# Patient Record
Sex: FEMALE | Race: WHITE | Employment: UNEMPLOYED | URBAN - METROPOLITAN AREA
[De-identification: names, ages, dates, MRNs, and addresses within clinical notes are randomized per-mention and may not be internally consistent; named-entity substitution may affect disease eponyms.]

---
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
## 2021-07-31 ENCOUNTER — HOSPITAL ENCOUNTER (EMERGENCY)
Age: 21
Discharge: HOME | End: 2021-07-31
Attending: EMERGENCY MEDICINE
Payer: COMMERCIAL

## 2021-07-31 VITALS
HEART RATE: 83 BPM | TEMPERATURE: 98.8 F | DIASTOLIC BLOOD PRESSURE: 71 MMHG | RESPIRATION RATE: 16 BRPM | HEIGHT: 67 IN | WEIGHT: 162 LBS | BODY MASS INDEX: 25.43 KG/M2 | SYSTOLIC BLOOD PRESSURE: 135 MMHG | OXYGEN SATURATION: 98 %

## 2021-07-31 DIAGNOSIS — T23.222A PARTIAL THICKNESS BURN OF FINGER OF LEFT HAND, INITIAL ENCOUNTER: Primary | ICD-10-CM

## 2021-07-31 PROCEDURE — 99283 EMERGENCY DEPT VISIT LOW MDM: CPT

## 2021-07-31 PROCEDURE — 2500000003 HC RX 250 WO HCPCS: Performed by: EMERGENCY MEDICINE

## 2021-07-31 PROCEDURE — 16020 DRESS/DEBRID P-THICK BURN S: CPT

## 2021-07-31 RX ORDER — IBUPROFEN 800 MG/1
800 TABLET ORAL EVERY 8 HOURS PRN
Qty: 20 TABLET | Refills: 0 | Status: SHIPPED | OUTPATIENT
Start: 2021-07-31 | End: 2022-08-23

## 2021-07-31 RX ORDER — ACETAMINOPHEN AND CODEINE PHOSPHATE 300; 30 MG/1; MG/1
1 TABLET ORAL EVERY 6 HOURS PRN
Qty: 20 TABLET | Refills: 0 | Status: SHIPPED | OUTPATIENT
Start: 2021-07-31 | End: 2021-08-05

## 2021-07-31 RX ADMIN — SILVER SULFADIAZINE: 10 CREAM TOPICAL at 02:50

## 2021-07-31 ASSESSMENT — ENCOUNTER SYMPTOMS
DIARRHEA: 0
EYE DISCHARGE: 0
EYE REDNESS: 0
FACIAL SWELLING: 0
VOMITING: 0
SHORTNESS OF BREATH: 0
ABDOMINAL PAIN: 0
COLOR CHANGE: 0
COUGH: 0
CONSTIPATION: 0

## 2021-07-31 ASSESSMENT — PAIN SCALES - GENERAL: PAINLEVEL_OUTOF10: 5
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
## 2021-11-07 ENCOUNTER — HOSPITAL ENCOUNTER (EMERGENCY)
Age: 21
Discharge: HOME | End: 2021-11-07
Attending: EMERGENCY MEDICINE
Payer: COMMERCIAL

## 2021-11-07 VITALS
HEIGHT: 67 IN | OXYGEN SATURATION: 99 % | BODY MASS INDEX: 25.43 KG/M2 | RESPIRATION RATE: 16 BRPM | WEIGHT: 162 LBS | HEART RATE: 115 BPM | TEMPERATURE: 97.9 F | DIASTOLIC BLOOD PRESSURE: 86 MMHG | SYSTOLIC BLOOD PRESSURE: 153 MMHG

## 2021-11-07 DIAGNOSIS — S90.31XA CONTUSION OF RIGHT FOOT, INITIAL ENCOUNTER: Primary | ICD-10-CM

## 2021-11-07 PROCEDURE — 99284 EMERGENCY DEPT VISIT MOD MDM: CPT

## 2021-11-07 ASSESSMENT — ENCOUNTER SYMPTOMS
VOMITING: 0
DIARRHEA: 0
NAUSEA: 1
BACK PAIN: 0
ABDOMINAL PAIN: 0
SHORTNESS OF BREATH: 0

## 2021-11-07 NOTE — ED NOTES
Pt arrived to the ED with complaints that her rt foot went numb earlier and that she \"thinks she has a blood clot. \" Pt states that her foot is no longer numb and that she is able to feel me touching it. RR even and unlabored. No distress noted.       Felecia aMrtinez RN  11/07/21 5025

## 2021-11-07 NOTE — ED PROVIDER NOTES
Last Year: Not on file   Transportation Needs:     Lack of Transportation (Medical): Not on file    Lack of Transportation (Non-Medical): Not on file   Physical Activity:     Days of Exercise per Week: Not on file    Minutes of Exercise per Session: Not on file   Stress:     Feeling of Stress : Not on file   Social Connections:     Frequency of Communication with Friends and Family: Not on file    Frequency of Social Gatherings with Friends and Family: Not on file    Attends Islam Services: Not on file    Active Member of [address] [place] [address] [employer] or Organizations: Not on file    Attends Club or Organization Meetings: Not on file    Marital Status: Not on file   Intimate Partner Violence:     Fear of Current or Ex-Partner: Not on file    Emotionally Abused: Not on file    Physically Abused: Not on file    Sexually Abused: Not on file   Housing Stability:     Unable to Pay for Housing in the Last Year: Not on file    Number of [place] in the Last Year: Not on file    Unstable Housing in the Last Year: Not on file       Family History   Family history unknown: Yes        Allergies:  Mark-allergin [diphenhydramine]    Home Medications:  Prior to Admission medications    Medication Sig Start Date End Date Taking? Authorizing Provider   ibuprofen (ADVIL;MOTRIN) 800 MG tablet Take 1 tablet by mouth every 8 hours as needed for Pain 7/31/21   Woo Bradford MD   FLUoxetine (PROZAC) 10 MG capsule Take 1 capsule by mouth daily 7/23/20   Monica Kohler MD   traZODone (DESYREL) 50 MG tablet Take 1 tablet by mouth nightly as needed for Sleep 7/22/20   Monica Kohler MD   risperiDONE (RISPERDAL) 0.5 MG tablet Take 1 tablet by mouth 2 times daily 7/22/20   Monica Kohler MD       REVIEW OFSYSTEMS    (2-9 systems for level 4, 10 or more for level 5)      Review of Systems   Constitutional: Negative for chills and fever. HENT: Negative for tinnitus. Eyes: Negative for visual disturbance.    Respiratory: Negative for shortness of breath. Cardiovascular: Negative for chest pain and leg swelling. Gastrointestinal: Positive for nausea. Negative for abdominal pain, diarrhea and vomiting. Genitourinary: Negative for dysuria. Musculoskeletal: Negative for back pain. Neurological: Negative for dizziness, light-headedness and headaches. Hematological: Does not bruise/bleed easily. Psychiatric/Behavioral: The patient is nervous/anxious. PHYSICAL EXAM   (up to 7 for level 4, 8 or more forlevel 5)      INITIAL VITALS:   ED Triage Vitals [11/07/21 0011]   BP Temp Temp Source Pulse Resp SpO2 Height Weight   (!) 153/86 97.9 °F (36.6 °C) Infrared 115 16 99 % 5' 6.5\" (1.689 m) 162 lb (73.5 kg)       Physical Exam  Vitals reviewed. Constitutional:       General: She is not in acute distress. Appearance: Normal appearance. She is not ill-appearing. HENT:      Head: Normocephalic and atraumatic. Right Ear: External ear normal.      Left Ear: External ear normal.      Nose: Nose normal. No rhinorrhea. Mouth/Throat:      Mouth: Mucous membranes are dry. Eyes:      Extraocular Movements: Extraocular movements intact. Pupils: Pupils are equal, round, and reactive to light. Cardiovascular:      Rate and Rhythm: Regular rhythm. Tachycardia present. Pulses: Normal pulses. Heart sounds: Normal heart sounds. Pulmonary:      Effort: Pulmonary effort is normal.      Breath sounds: Normal breath sounds. Musculoskeletal:         General: No swelling or tenderness. Cervical back: Normal range of motion and neck supple. Skin:     General: Skin is warm and dry. Capillary Refill: Capillary refill takes less than 2 seconds. Comments: 3 mm diameter ecchymosis overlying the dorsum of the right foot. Neurological:      General: No focal deficit present. Mental Status: She is alert and oriented to person, place, and time.    Psychiatric:         Mood and Affect: Mood normal. Behavior: Behavior normal.         DIFFERENTIAL  DIAGNOSIS     PLAN (LABS / IMAGING / EKG):  No orders of the defined types were placed in this encounter. MEDICATIONS ORDERED:  No orders of the defined types were placed in this encounter. DDX: Superficial thrombophlebitis, superficial bruise    Initial MDM/Plan: 22 y.o. female who presents with small ecchymosis of right foot. Lesion is nontender and not warm. Clinical picture most consistent with ecchymosis and did not venous thrombosis or superficial thrombophlebitis. No work-up is required at this time. Reassured patient and discussed clinical presentation DVTs signs/symptoms to look out for. Patient acknowledged understanding. Will discharge patient home. DIAGNOSTIC RESULTS / EMERGENCYDEPARTMENT COURSE / MDM     LABS:  Labs Reviewed - No data to display      RADIOLOGY:  No results found. EMERGENCY DEPARTMENT COURSE:    Per initial MDM      PROCEDURES:  None    CONSULTS:  None    CRITICAL CARE:  Please see attending note    FINAL IMPRESSION      1.  Contusion of right foot, initial encounter          DISPOSITION / PLAN     DISPOSITION Decision To Discharge 11/07/2021 01:28:46 AM      PATIENT REFERRED TO:  [facility]  [address] [place] [address]  [phone]    If symptoms worsen      DISCHARGE MEDICATIONS:  New Prescriptions    No medications on file       Chandler Valdez MD  Emergency Medicine Resident    (Please note that portions of this note were completed with a voice recognition program.Efforts were made to edit the dictations but occasionally words are mis-transcribed.)      Chandler Valdez MD  Resident  11/07/21 0740

## 2021-11-10 NOTE — ED PROVIDER NOTES
[facility] 46   Emergency Department  Faculty Attestation       I performed a history and physical examination of the patient and discussed management with the resident. I reviewed the residents note and agree with the documented findings including all diagnostic interpretations and plan of care. Any areas of disagreement are noted on the chart. I was personally present for the key portions of any procedures. I have documented in the chart those procedures where I was not present during the key portions. I have reviewed the emergency nurses triage note. I agree with the chief complaint, past medical history, past surgical history, allergies, medications, social and family history as documented unless otherwise noted below. Documentation of the HPI, Physical Exam and Medical Decision Making performed by [email or web address] is based on my personal performance of the HPI, PE and MDM. For Physician Assistant/ Nurse Practitioner cases/documentation I have personally evaluated this patient and have completed at least one if not all key elements of the E/M (history, physical exam, and MDM). Additional findings are as noted. Pertinent Comments     Primary Care Physician: No primary care provider on file. ED Triage Vitals [11/07/21 0011]   BP Temp Temp Source Pulse Resp SpO2 Height Weight   (!) 153/86 97.9 °F (36.6 °C) Infrared 115 16 99 % 5' 6.5\" (1.689 m) 162 lb (73.5 kg)        History/Physical: This is a 22 y.o. female who presents to the Emergency Department with complaint of right foot spot. She noticed a dark spot on the dorsum of her right foot. No trauma. No pain of the calf or leg. No h/o DVT or PE. On exam the dorusm of the right foot has a small ecchymotic area with minimal tenderness. No warmth. No ucleration. Normal ROM of the foot and ankle. No tenderness of posterior calf. Pulses 2+.   Sensation and strength intact    MDM/Plan:   Contusion of the foot  Patient concerned for clot, but there is no indication of involvement of any deep venous system at this time, but did give her strict return precautions  [id number] [address] for d/c       Critical Care: None     Gianna Baltazar MD  Attending Emergency Physician        Gianna Baltazar MD  11/10/21 9868

## 2022-08-21 ENCOUNTER — HOSPITAL ENCOUNTER (EMERGENCY)
Age: 22
Discharge: HOME | End: 2022-08-22
Attending: EMERGENCY MEDICINE
Payer: COMMERCIAL

## 2022-08-21 VITALS
OXYGEN SATURATION: 99 % | SYSTOLIC BLOOD PRESSURE: 157 MMHG | HEART RATE: 79 BPM | RESPIRATION RATE: 18 BRPM | WEIGHT: 195 LBS | BODY MASS INDEX: 31.34 KG/M2 | TEMPERATURE: 97.9 F | HEIGHT: 66 IN | DIASTOLIC BLOOD PRESSURE: 74 MMHG

## 2022-08-21 DIAGNOSIS — L72.9 CYST OF SKIN: Primary | ICD-10-CM

## 2022-08-21 ASSESSMENT — PAIN - FUNCTIONAL ASSESSMENT: PAIN_FUNCTIONAL_ASSESSMENT: NONE - DENIES PAIN

## 2022-08-22 ENCOUNTER — ANCILLARY PROCEDURE (OUTPATIENT)
Dept: EMERGENCY DEPT | Age: 22
End: 2022-08-22
Payer: COMMERCIAL

## 2022-08-22 ENCOUNTER — HOSPITAL ENCOUNTER (EMERGENCY)
Age: 22
Discharge: HOME | End: 2022-08-23
Attending: EMERGENCY MEDICINE
Payer: COMMERCIAL

## 2022-08-22 DIAGNOSIS — L73.9 FOLLICULITIS: Primary | ICD-10-CM

## 2022-08-22 PROCEDURE — 99283 EMERGENCY DEPT VISIT LOW MDM: CPT

## 2022-08-22 PROCEDURE — 87660 TRICHOMONAS VAGIN DIR PROBE: CPT

## 2022-08-22 PROCEDURE — 87510 GARDNER VAG DNA DIR PROBE: CPT

## 2022-08-22 PROCEDURE — 76882 US LMTD JT/FCL EVL NVASC XTR: CPT

## 2022-08-22 PROCEDURE — 87480 CANDIDA DNA DIR PROBE: CPT

## 2022-08-22 PROCEDURE — 87491 CHLMYD TRACH DNA AMP PROBE: CPT

## 2022-08-22 PROCEDURE — 81025 URINE PREGNANCY TEST: CPT

## 2022-08-22 PROCEDURE — 81001 URINALYSIS AUTO W/SCOPE: CPT

## 2022-08-22 PROCEDURE — 87591 N.GONORRHOEAE DNA AMP PROB: CPT

## 2022-08-22 ASSESSMENT — PAIN SCALES - GENERAL: PAINLEVEL_OUTOF10: 0

## 2022-08-22 ASSESSMENT — PAIN - FUNCTIONAL ASSESSMENT: PAIN_FUNCTIONAL_ASSESSMENT: 0-10

## 2022-08-22 NOTE — ED PROVIDER NOTES
[facility]   Emergency Department  Faculty Attestation       I performed a history and physical examination of the patient and discussed management with the resident. I reviewed the residents note and agree with the documented findings including all diagnostic interpretations and plan of care. Any areas of disagreement are noted on the chart. I was personally present for the key portions of any procedures. I have documented in the chart those procedures where I was not present during the key portions. I have reviewed the emergency nurses triage note. I agree with the chief complaint, past medical history, past surgical history, allergies, medications, social and family history as documented unless otherwise noted below. Documentation of the HPI, Physical Exam and Medical Decision Making performed by scribdidi is based on my personal performance of the HPI, PE and MDM. For Physician Assistant/ Nurse Practitioner cases/documentation I have personally evaluated this patient and have completed at least one if not all key elements of the E/M (history, physical exam, and MDM). Additional findings are as noted. Pertinent Comments     Primary Care Physician: No primary care provider on file. ED Triage Vitals [08/21/22 2348]   BP Temp Temp Source Heart Rate Resp SpO2 Height Weight   133/73 97.3 °F (36.3 °C) Oral 73 16 100 % 5' 6\" (1.676 m) 195 lb (88.5 kg)          This is a 25 y.o. female who presents to the Emergency Department bump to the inner left thigh. Noticed about 1 month ago. Has had 1 similar one in the past that then leaked some clear fluid and then resolved. Exam performed with resident in the room as chaperone. On exam, there is a small area  ~ 3 cm distal from inguinal fold on the left inner thigh has some hyperpigmentation does have a small mobile soft density that is less than 1 cm underneath the skin. No tenderness. No surrounding erythema. No fluctuance.   Does not appear to be an abscess. Bedside point of care ultrasound performed. Nonspecific findings, no obvious drainable abscess or cystic area. Read/report available in Epic under imaging section and images available in PACS. Unclear if this finding, however does not have any signs of infection at this time. No areas to drain. And therefore recommend outpatient follow-up with dermatology for skin lesion.   Okay for discharge      Critical Care: None    Jennifer Barajas MD  Attending Emergency Physician         Jennifer Barajas MD  08/22/22 1124

## 2022-08-22 NOTE — ED PROVIDER NOTES
/ FAMILY HISTORY      has no past medical history on file. Denies past medical history     has no past surgical history on file. Denies past surgical history    Social History     Socioeconomic History    Marital status: Single     Spouse name: Not on file    Number of children: Not on file    Years of education: Not on file    Highest education level: Not on file   Occupational History    Not on file   Tobacco Use    Smoking status: Every Day     Packs/day: 1.00     Types: Cigarettes    Smokeless tobacco: Never   Vaping Use    Vaping Use: Never used   Substance and Sexual Activity    Alcohol use: Yes     Comment: occ    Drug use: Yes     Types: Marijuana Longoria Kugel)    Sexual activity: Yes     Partners: Female   Other Topics Concern    Not on file   Social History Narrative    Not on file     Social Determinants of Health     Financial Resource Strain: Low Risk     Difficulty of Paying Living Expenses: Not hard at all   Food Insecurity: No Food Insecurity    Worried About Running Out of Food in the Last Year: Never true    Ran Out of Food in the Last Year: Never true   Transportation Needs: Not on file   Physical Activity: Not on file   Stress: Not on file   Social Connections: Not on file   Intimate Partner Violence: Not on file   Housing Stability: Not on file       Family History   Family history unknown: Yes       Allergies:  Mark-allergin [diphenhydramine]    Home Medications:  Prior to Admission medications    Medication Sig Start Date End Date Taking?  Authorizing Provider   cephALEXin (KEFLEX) 500 MG capsule Take 1 capsule by mouth 4 times daily for 7 days 8/23/22 8/30/22  Queen Hector MD   metroNIDAZOLE (FLAGYL) 500 MG tablet Take 1 tablet by mouth 2 times daily for 7 days 8/30/22 9/6/22  Howard Denney MD   cloNIDine (CATAPRES) 0.1 MG tablet  6/21/22   Historical Provider, MD   FLUoxetine (PROZAC) 20 MG capsule  7/26/22   Historical Provider, MD       REVIEW OF SYSTEMS    (2-9 systems for level 4, 10 or more for level 5)      Review of Systems   Constitutional:  Negative for chills, fatigue and fever. HENT:  Negative for congestion, rhinorrhea and sore throat. Eyes:  Negative for visual disturbance. Respiratory:  Negative for cough and shortness of breath. Cardiovascular:  Negative for chest pain. Gastrointestinal:  Negative for abdominal pain, diarrhea, nausea and vomiting. Genitourinary:  Negative for dysuria and hematuria. Musculoskeletal:  Negative for arthralgias and myalgias. Skin:  Negative for rash. Nodule on left inner thigh   Neurological:  Negative for dizziness, tremors, syncope, weakness, light-headedness, numbness and headaches. All other systems reviewed and are negative. PHYSICAL EXAM   (up to 7 for level 4, 8 or more for level 5)      INITIAL VITALS:   BP (!) 157/74   Pulse 79   Temp 97.9 °F (36.6 °C)   Resp 18   Ht 5' 6\" (1.676 m)   Wt 195 lb (88.5 kg)   SpO2 99%   BMI 31.47 kg/m²     Physical Exam  Vitals reviewed. Constitutional:       General: She is not in acute distress. Appearance: She is not toxic-appearing. HENT:      Head: Normocephalic and atraumatic. Mouth/Throat:      Mouth: Mucous membranes are moist.      Pharynx: Oropharynx is clear. Eyes:      Extraocular Movements: Extraocular movements intact. Pupils: Pupils are equal, round, and reactive to light. Cardiovascular:      Rate and Rhythm: Normal rate and regular rhythm. Pulses: Normal pulses. Heart sounds: Normal heart sounds. Pulmonary:      Effort: Pulmonary effort is normal.      Breath sounds: Normal breath sounds. No decreased breath sounds, wheezing, rhonchi or rales. Abdominal:      Palpations: Abdomen is soft. Tenderness: There is no abdominal tenderness. There is no guarding or rebound. Genitourinary:     Comments: Small soft mobile density on left inner thigh near inguinal crease. Nontender to palpation. Fully mobile on examination.   No fluctuance. No erythema. Musculoskeletal:         General: Normal range of motion. Cervical back: Normal range of motion and neck supple. Right lower leg: No edema. Left lower leg: No edema. Skin:     Capillary Refill: Capillary refill takes less than 2 seconds. Findings: No rash. Neurological:      General: No focal deficit present. Mental Status: She is alert and oriented to person, place, and time. Motor: No weakness. DIFFERENTIAL  DIAGNOSIS     PLAN (LABS / IMAGING / EKG):  Orders Placed This Encounter   Procedures    POC US EXTREMITY NON VASC LIMITED       MEDICATIONS ORDERED:  No orders of the defined types were placed in this encounter. DDX: Cyst versus abscess versus soft tissue infection versus cellulitis versus sexually-transmitted infection versus inflamed lymph node    DIAGNOSTIC RESULTS / EMERGENCY DEPARTMENT COURSE / MDM   LAB RESULTS:  No results found for this visit on 08/21/22. IMPRESSION: 78-year-old female presents with a bump to her left inner groin that has been present and unchanging for the past 2 months. On examination, patient resting comfortably in the bed in no acute distress. Vital signs stable. Patient afebrile. There is a small mobile soft density near the inguinal fold on the left inner thigh with mild hyperpigmentation. There is no fluctuance to the area. The area is mobile with any palpation. No surrounding erythema. Nontender to palpation in the area. Bedside ultrasound showing no obvious drainable abscess or cystic area. No other similar areas anywhere on patient's body. Discussed with patient that there is no area that is drainable at this time, and she does not have any current signs of infection. Recommended the patient follow-up with dermatology for skin lesion. Provided recommendation for PCP and dermatology follow-up. Discussed return precautions at length.   Patient voiced understanding and agreement with plan.    RADIOLOGY:  POC US EXTREMITY NON VASC LIMITED    Result Date: 8/22/2022  POCUS_Soft_Tissue:     Exam Information:          Exam type:  Clinically indicated     Indication(s) for Exam:          The exam was performed with the following indications[de-identified]  Soft tissue pain, Soft tissue swelling     Views obtained/location & Images Saved for These Views:          Multiple sonographic views were obtained in the following specific location:  Left thigh just distal to inguinal fold     Findings:          Exam of the above structures revealed the following findings[de-identified]  Normal exam     Interpretation:          Indeterminate         Other[de-identified]  No obvious fluid collection - No cobblestoning. There is a small indeterminate circular hyperechoic region that is nonspecific. Confirmatory study:          What confirmatory study was done?:  Not applicable  Electronically signed by Cece Chavez on Monday, August 22, 2022 at 12:37 AM : Ankit Davidson Attending: Cece Chavez      CONSULTS:  None    FINAL IMPRESSION      1.  Cyst of skin          DISPOSITION / PLAN     DISPOSITION Decision To Discharge 08/22/2022 12:48:46 AM      PATIENT REFERRED TO:  [address] [place], [place] [id number] [phone]  Schedule an appointment as soon as possible for a visit in 2 days      [facility]  [address] [facility] [address]  [phone]  Go to   If symptoms worsen    [address] [place] [address]  [phone]  Go to   As needed    DISCHARGE MEDICATIONS:  Discharge Medication List as of 8/22/2022 12:52 AM          Maggie Guzman MD  Emergency Medicine Resident    (Please note that portions of thisnote were completed with a voice recognition program.  Efforts were made to edit the dictations but occasionally words are mis-transcribed.)       Maggie Guzman MD  Resident  08/27/22 6051

## 2022-08-22 NOTE — DISCHARGE INSTRUCTIONS
Please schedule a follow up appointment with a primary care physician and a dermatologist. Lucie Avila do not need antibiotics at this time - however, if you start to have fevers or pus-like drainage from the area, you may need to start antibiotics and should be seen by a physician. For pain use ibuprofen (Motrin / Advil) or acetaminophen (Tylenol), unless prescribed medications that have acetaminophen in it. You can take over the counter acetaminophen tablets (1 - 2 tablets of the 500-mg strength every 6 hours) or ibuprofen tablets (2 tablets every 4 hours). PLEASE RETURN TO THE EMERGENCY DEPARTMENT IMMEDIATELY for worsening symptoms, white drainage from the wound, redness or streaking, or if you develop any concerning symptoms such as: high fever not relieved by acetaminophen (Tylenol) and/or ibuprofen (Motrin / Advil), chills, shortness of breath, chest pain, feeling of your heart fluttering or racing, persistent nausea and/or vomiting, numbness, weakness or tingling in the arms or legs or change in color of the extremities, changes in mental status, persistent headache, blurry vision, unable to follow up with your physician, or other any other care or concern.

## 2022-08-22 NOTE — ED TRIAGE NOTES
Pt arrived to Ed with c/o boil on left side og groin./labia region. Pt states she noticed bump about 1 month ago. Pt denies any fever and/or chills. Pt A&O x 4, does not appear in acute distress, RR even and unlabored, resting comfortably on stretcher with eyes open and call light in reach. Vital signs obtained, medical hx and allergies reviewed with pt. Initial assessment performed by physician, Celsa Maradiaga will carry out initial orders/tasks and reassess pt.

## 2022-08-23 VITALS
OXYGEN SATURATION: 99 % | HEART RATE: 100 BPM | SYSTOLIC BLOOD PRESSURE: 122 MMHG | DIASTOLIC BLOOD PRESSURE: 77 MMHG | RESPIRATION RATE: 18 BRPM | TEMPERATURE: 98.7 F

## 2022-08-23 PROBLEM — B96.89 BACTERIAL VAGINOSIS: Status: ACTIVE | Noted: 2022-08-23

## 2022-08-23 PROBLEM — N76.0 BACTERIAL VAGINOSIS: Status: ACTIVE | Noted: 2022-08-23

## 2022-08-23 PROBLEM — N89.8 VAGINAL CYST: Status: ACTIVE | Noted: 2022-08-23

## 2022-08-23 LAB
BILIRUBIN URINE: NEGATIVE
C TRACH DNA GENITAL QL NAA+PROBE: NEGATIVE
CANDIDA SPECIES, DNA PROBE: NEGATIVE
CASTS UA: NORMAL /LPF (ref 0–8)
COLOR: YELLOW
CULTURE: NO GROWTH
EPITHELIAL CELLS UA: NORMAL /HPF (ref 0–5)
GARDNERELLA VAGINALIS, DNA PROBE: POSITIVE
GLUCOSE URINE: NEGATIVE
HCG(URINE) PREGNANCY TEST: NEGATIVE
KETONES, URINE: ABNORMAL
LEUKOCYTE ESTERASE, URINE: ABNORMAL
N. GONORRHOEAE DNA: NEGATIVE
NITRITE, URINE: NEGATIVE
PH UA: 6.5 (ref 5–8)
PROTEIN UA: NEGATIVE
RBC UA: NORMAL /HPF (ref 0–4)
SOURCE: ABNORMAL
SPECIFIC GRAVITY UA: 1.01 (ref 1–1.03)
SPECIMEN DESCRIPTION: NORMAL
SPECIMEN DESCRIPTION: NORMAL
TRICHOMONAS VAGINALIS DNA: NEGATIVE
TURBIDITY: CLEAR
URINE HGB: NEGATIVE
UROBILINOGEN, URINE: NORMAL
WBC UA: NORMAL /HPF (ref 0–5)

## 2022-08-23 PROCEDURE — 87086 URINE CULTURE/COLONY COUNT: CPT

## 2022-08-23 RX ORDER — CEPHALEXIN 500 MG/1
500 CAPSULE ORAL 4 TIMES DAILY
Qty: 28 CAPSULE | Refills: 0 | Status: SHIPPED | OUTPATIENT
Start: 2022-08-23 | End: 2022-08-29

## 2022-08-23 ASSESSMENT — ENCOUNTER SYMPTOMS
BLOOD IN STOOL: 0
DIARRHEA: 0
NAUSEA: 0
ABDOMINAL PAIN: 0
VOMITING: 0
CONSTIPATION: 0

## 2022-08-23 NOTE — ED TRIAGE NOTES
Pt arrived to Ed with c/o lump on labia which showed up today. Pt was seen yesterday for cyst on left inner groin. Writer is extended triage nurse. Assessment and treatment for this patient was primarily performed by resident and attending with extended triage nurse performing tasks as directed. Pt seen primarily by resident and attending physicians. RN assessment deferred to physician assessment.

## 2022-08-23 NOTE — ED NOTES
The following labs labeled with pt sticker and tubed to lab:     [] Blue     [] Lavender   [] on ice  [] Green/yellow  [] Green/black [] on ice  [] Pia Greener  [] on ice  [] Yellow  [] Red  [] Pink  [] VBG  [] VBG    [] COVID-19 swab    [] Rapid  [] PCR  [] Flu swab  [] Peds Viral Panel     [x] Urine Sample  [x] Pelvic Cultures  [] Blood Cultures   [] STREP Cultures       Trina Calzada LPN  61/27/75 1332

## 2022-08-23 NOTE — ED NOTES
Urine and pelvic specimens obtained, labelled appropriately, and sent to hoda.       Wendy Howard LPN  05/73/68 9833

## 2022-08-24 NOTE — ED PROVIDER NOTES
[facility] ED  Emergency Department Encounter  Emergency Medicine Resident     Pt Cade Marie  MRN: [id number]  [place] 2000  Date of evaluation: 8/23/22  PCP:  Howard Denney MD      CHIEF COMPLAINT       Chief Complaint   Patient presents with    Cyst     On labia        HISTORY OF PRESENT ILLNESS  (Location/Symptom, Timing/Onset, Context/Setting, Quality, Duration, Modifying Factors, Severity.)      Elva Garica is a 25 y.o. female who presents with a cyst in the groin. Patient states that she was discharged yesterday for a similar encounter, being diagnosed with a cyst on her left upper thigh. Patient was informed to return to the emergency department should she notice worsening of her symptoms including new cyst.  The patient states that she just noticed this cyst today, but is unsure of how long it has been there. The patient denies any vaginal discharge, vaginal bleeding, and has regular menstrual periods [LMP 3 weeks ago]. Patient denies any fever, chills, rash, other skin lesions, history of previous skin lesions, or use of new soaps/skin products. Patient is sexually active with 1 male partner. PAST MEDICAL / SURGICAL / SOCIAL / FAMILY HISTORY      has no past medical history on file. has no past surgical history on file.       Social History     Socioeconomic History    Marital status: Single     Spouse name: Not on file    Number of children: Not on file    Years of education: Not on file    Highest education level: Not on file   Occupational History    Not on file   Tobacco Use    Smoking status: Every Day     Packs/day: 1.00     Types: Cigarettes    Smokeless tobacco: Never   Vaping Use    Vaping Use: Never used   Substance and Sexual Activity    Alcohol use: Yes     Comment: occ    Drug use: Yes     Types: Marijuana Longoriahoney Garcia)    Sexual activity: Yes     Partners: Female   Other Topics Concern    Not on file   Social History Narrative    Not on file toxic-appearing. Abdominal:      General: Abdomen is flat. There is no distension. Palpations: Abdomen is soft. Tenderness: There is no abdominal tenderness. There is no guarding or rebound. Hernia: There is no hernia in the left inguinal area or right inguinal area. Genitourinary:     General: Normal vulva. Exam position: Lithotomy position. Pubic Area: No rash. Usman stage (genital): 5. Labia:         Right: No tenderness, lesion or injury. Left: No tenderness, lesion or injury. Vagina: No signs of injury and foreign body. No erythema, tenderness, bleeding or lesions. Cervix: Cervical motion tenderness present. No discharge, friability, erythema or cervical bleeding. Adnexa: Right adnexa normal and left adnexa normal.        Right: No mass, tenderness or fullness. Left: No mass, tenderness or fullness. Comments: 1 lesion measuring 0.5 cm in diameter located lateral to the left labial folds. Lymphadenopathy:      Lower Body: No right inguinal adenopathy. No left inguinal adenopathy. Skin:     General: Skin is warm and dry. Capillary Refill: Capillary refill takes less than 2 seconds. Neurological:      Mental Status: She is alert and oriented to person, place, and time.        DIFFERENTIAL  DIAGNOSIS     PLAN (LABS / IMAGING / EKG):  Orders Placed This Encounter   Procedures    C.trachomatis N.gonorrhoeae DNA (Cervical or Vaginal Swab)    Vaginitis DNA Probe    Culture, Urine    Urinalysis with Reflex to Culture    Pregnancy, Urine    Microscopic Urinalysis    Pelvic exam setup    Vital signs       MEDICATIONS ORDERED:  Orders Placed This Encounter   Medications    cephALEXin (KEFLEX) 500 MG capsule     Sig: Take 1 capsule by mouth 4 times daily for 7 days     Dispense:  28 capsule     Refill:  0       DDX: Folliculitis, Bartholin abscess, Bartholin cyst, skene cyst/abscess, syphilis, skin abscess, and herpetic infection were all considered in the differential diagnosis. DIAGNOSTIC RESULTS / EMERGENCY DEPARTMENT COURSE / [facility]   LAB RESULTS:  Results for orders placed or performed during the hospital encounter of 08/22/22   C.trachomatis N.gonorrhoeae DNA (Cervical or Vaginal Swab)    Specimen: Cervix   Result Value Ref Range    Specimen Description . CERVIX     C. trachomatis DNA NEGATIVE NEGATIVE    N. gonorrhoeae DNA NEGATIVE NEGATIVE   Vaginitis DNA Probe    Specimen: Vaginal   Result Value Ref Range    Source . VAGINAL SWAB     Trichomonas Vaginalis DNA NEGATIVE NEGATIVE    GARDNERELLA VAGINALIS, DNA PROBE POSITIVE (A) NEGATIVE    CANDIDA SPECIES, DNA PROBE NEGATIVE NEGATIVE   Urinalysis with Reflex to Culture    Specimen: Urine   Result Value Ref Range    Color, UA Yellow Yellow    Turbidity UA Clear Clear    Glucose, Ur NEGATIVE NEGATIVE    Bilirubin Urine NEGATIVE NEGATIVE    Ketones, Urine TRACE (A) NEGATIVE    Specific Gravity, UA 1.015 1.005 - 1.030    Urine Hgb NEGATIVE NEGATIVE    pH, UA 6.5 5.0 - 8.0    Protein, UA NEGATIVE NEGATIVE    Urobilinogen, Urine Normal Normal    Nitrite, Urine NEGATIVE NEGATIVE    Leukocyte Esterase, Urine SMALL (A) NEGATIVE   Pregnancy, Urine   Result Value Ref Range    HCG(Urine) Pregnancy Test NEGATIVE NEGATIVE   Microscopic Urinalysis   Result Value Ref Range    WBC, UA 10 TO 20 0 - 5 /HPF    RBC, UA 2 TO 5 0 - 4 /HPF    Casts UA  0 - 8 /LPF     2 TO 5 HYALINE Reference range defined for non-centrifuged specimen.     Epithelial Cells UA 5 TO 10 0 - 5 /HPF       IMPRESSION: Skin abscess    RADIOLOGY:  No orders to display     EKG    All EKG's are interpreted by the Emergency Department Physician who either signs or Co-signs this chart in the absence of a cardiologist.    EMERGENCY DEPARTMENT COURSE:  Following history and examination, the patient was informed that a pelvic exam could be conducted as well but was not necessary for evaluation of the cyst.  The patient stated that she wanted the full pelvic examination. Routine pelvic labs were performed [gonorrhea and chlamydia screen, BV probe, urine pregnancy] as well as urinalysis. Results were positive for BV, the patient was informed that this is a bacterial overgrowth and as she is asymptomatic does not require treatment. Patient stated that she understood and was agreeable to being discharged on oral antibiotics for treatment of folliculitis. The patient stated that she had a follow-up with a new GP this week and would discuss with them options to see a gynecologist for future gynecological treatment. No notes of EC Admission Criteria type on file. PROCEDURES:  None    CONSULTS:  None    CRITICAL CARE:  None         FINAL IMPRESSION      1.  Folliculitis          DISPOSITION / PLAN     DISPOSITION Decision To Discharge 08/23/2022 02:05:23 AM      PATIENT REFERRED TO:  [facility]  [address] [place], [place] [phone]  Go to   If symptoms worsen      DISCHARGE MEDICATIONS:  Discharge Medication List as of 8/23/2022  2:06 AM        START taking these medications    Details   cephALEXin (KEFLEX) 500 MG capsule Take 1 capsule by mouth 4 times daily for 7 days, Disp-28 capsule, R-0Print             Yuliya Spangler MD  Emergency Medicine Resident    (Please note that portions of thisnote were completed with a voice recognition program.  Efforts were made to edit the dictations but occasionally words are mis-transcribed.)      Yuliya Spangler MD  Resident  08/23/22 [id number]       Yuliya Spangler MD  Resident  08/24/22 [id number]

## 2022-08-27 ASSESSMENT — ENCOUNTER SYMPTOMS
RHINORRHEA: 0
NAUSEA: 0
VOMITING: 0
COUGH: 0
SHORTNESS OF BREATH: 0
DIARRHEA: 0
ABDOMINAL PAIN: 0
SORE THROAT: 0

## 2022-08-27 NOTE — ED PROVIDER NOTES
[facility]   Emergency Department  Faculty Attestation       I performed a history and physical examination of the patient and discussed management with the resident. I reviewed the residents note and agree with the documented findings including all diagnostic interpretations and plan of care. Any areas of disagreement are noted on the chart. I was personally present for the key portions of any procedures. I have documented in the chart those procedures where I was not present during the key portions. I have reviewed the emergency nurses triage note. I agree with the chief complaint, past medical history, past surgical history, allergies, medications, social and family history as documented unless otherwise noted below. Documentation of the HPI, Physical Exam and Medical Decision Making performed by scribdidi is based on my personal performance of the HPI, PE and MDM. For Physician Assistant/ Nurse Practitioner cases/documentation I have personally evaluated this patient and have completed at least one if not all key elements of the E/M (history, physical exam, and MDM). Additional findings are as noted. Pertinent Comments     Primary Care Physician: Annmarie Restrepo MD      ED Triage Vitals   BP Temp Temp Source Heart Rate Resp SpO2 Height Weight   08/22/22 2239 08/22/22 2239 08/22/22 2239 08/22/22 2239 08/23/22 0202 08/22/22 2239 -- --   129/82 98.9 °F (37.2 °C) Oral (!) 104 18 97 %            This is a 25 y.o. female who presents to the Emergency Department new swelling in the groin area. Seen yesterday and had a nonspecific area oninner left thigh. Today has a swelling on the pubic region per patient. Pelvic exam and external exam performed by resident with writer as chaperone. There is a small area of folliculitis on the mons pubis area that has a small area of fluctuance.   Given that this is external we did discuss with patient on whether she would like to proceed w/. Pelvic and she did opt to continue. Given h/o multiple areas of swellings will start on antibiotics. UA   Vaginitis  G/C testing   Plan for d/c.        Critical Care: None     Brenda Zamudio MD  Attending Emergency Physician        Brenda Zamudio MD  08/27/22 1135

## 2022-08-29 ENCOUNTER — OFFICE VISIT (OUTPATIENT)
Dept: OBGYN CLINIC | Age: 22
End: 2022-08-29
Payer: COMMERCIAL

## 2022-08-29 ENCOUNTER — HOSPITAL ENCOUNTER (OUTPATIENT)
Age: 22
Setting detail: SPECIMEN
Discharge: HOME | End: 2022-08-29

## 2022-08-29 VITALS
HEART RATE: 61 BPM | SYSTOLIC BLOOD PRESSURE: 122 MMHG | DIASTOLIC BLOOD PRESSURE: 80 MMHG | WEIGHT: 181 LBS | BODY MASS INDEX: 29.09 KG/M2 | HEIGHT: 66 IN

## 2022-08-29 DIAGNOSIS — N76.0 ACUTE VAGINITIS: ICD-10-CM

## 2022-08-29 DIAGNOSIS — Z01.419 PAP SMEAR, AS PART OF ROUTINE GYNECOLOGICAL EXAMINATION: Primary | ICD-10-CM

## 2022-08-29 PROCEDURE — 99385 PREV VISIT NEW AGE 18-39: CPT | Performed by: CLINICAL NURSE SPECIALIST

## 2022-08-29 NOTE — PROGRESS NOTES
[facility]  [address] [address]  Dept: [phone]        DATE OF VISIT:  22        History and Physical    Porfirio Holley    :  2000  CHIEF COMPLAINT:    Chief Complaint   Patient presents with    Annual Exam                    Porfirio Holley is a 25 y.o. female new patient is a 24 yo female who presents for annual well woman exam.     The patient was seen and examined. Per the patient bowels areregular. She has no voiding complaints. She denies any bloating as well as vaginal discharge. Chaperone for Intimate Exam  Chaperone was offered as part of the rooming process. Patient declined and agrees to continue with exam without a chaperone. Chaperone: none     _____________________________________________________________________  Past Medical History:   Diagnosis Date    Depression     Bipolar                                                                   History reviewed. No pertinent surgical history.   Family History   Problem Relation Age of Onset    Stroke Paternal Grandfather     Hypertension Father     Diabetes Father     Hypertension Mother     Breast Cancer Mother      Social History     Tobacco Use   Smoking Status Every Day    Packs/day: 1.00    Types: Cigarettes   Smokeless Tobacco Never     Social History     Substance and Sexual Activity   Alcohol Use Yes    Comment: occ     Current Outpatient Medications   Medication Sig Dispense Refill    sulfamethoxazole-trimethoprim (BACTRIM DS;SEPTRA DS) 800-160 MG per tablet Take 1 tablet by mouth 2 times daily      cloNIDine (CATAPRES) 0.1 MG tablet       FLUoxetine (PROZAC) 20 MG capsule       fluconazole (DIFLUCAN) 150 MG tablet Take 1 tablet by mouth every 72 hours for 6 days (Patient not taking: Reported on 2022) 2 tablet 0    [START ON 2022] metroNIDAZOLE (FLAGYL) 500 MG tablet Take 1 tablet by mouth 2 times daily for 7 days (Patient not taking: Reported on 2022) 14 tablet 0     No current facility-administered medications for this visit. Allergies: Allergies   Allergen Reactions    Mark-Allergin [Diphenhydramine] Rash       Gynecologic History:  Patient's last menstrual period was 2022. Sexually Active: Yes  STD History:No  Birth Control: No    OB History    Para Term  AB Living   1 0 0 0 1 0   SAB IAB Ectopic Molar Multiple Live Births   1 0 0 0 0 0     ______________________________________________________________________    Review of Systems    REVIEW OFSYSTEMS:        Constitutional:  Unexpected weight change, extreme fatigue, night sweats              no  Skin:                           Rashes, moles   no  Neurological:  Frequentheadaches, seizures states that her psych doc thinks it is stress related has not see neurology         yes  Ophthalmic:  Recent visual changes no  ENT:   Difficulty swallowing  no  Breast:              Masses, pain, nipple discharge                            no     Respiratory:  Shortness of breath, coughing           no    Cardiovascular: Chest pain   no     Gastrointestinal: Chronic diarrhea/constipation, nausea/vomiting           no   Urogenital:  Urinary incontinence, frequency, urgency          no                                         Heavy/irregular periods           no                                      Vaginal discharge                   no  Hematological: Bruises easy   no     Endocrine:  Hot flashes   no     Hot/Cold Intolerance  no    Psychological:            Mood and affect were within normal limits. Depression and meds working well yes                 Physical Exam    Physical Exam:    Vitals:    22 1538   BP: 122/80   Site: Left Upper Arm   Position: Sitting   Cuff Size: Medium Adult   Pulse: 61   Weight: 181 lb (82.1 kg)   Height: 5' 6\" (1.676 m)       General Appearance: This  is a well developed, well nourished, well groomed female. Her BMI was reviewed. Nutritional decision making andexercise were discussed. Neurological:  The patient is alert and oriented to time,place, person, and situation    Skin:  A brief inspection of the skin revealed no rashes or lesions. Neck:  The neck was supple. Respiratory: There was unlabored respiratory effort. Lungs clear to ascultation. Cardiovascular: The patients extremities were without calf tenderness or edema. Heart with a regular rate and rhythm. Abdomen: The abdomen was soft and non-tender with no guarding, rebound or rigidity. No hernias were appreciated. Breast:   The patients breasts were symmetrical.  There were no masses, discharge or retractions noted. Self breast exams were reviewed. Pelvic Exam:  The external genitalia was with a normal appearance. The vaginal vault was normal. There were no cystocele, rectocele, or enterocele appreciated. There was thin and thicker white vaginal discharge with odor. The cervix was without lesions. There was no cervical motion tenderness. The uterus was mobile, midline and regular. The adnexa no fullness, tenderness or masses appreciated. ASSESSMENT: Normal annual well woman exam with vaginitis    25 y.o. Female; Annual   Diagnosis Orders   1. Pap smear, as part of routine gynecological examination  PAP SMEAR      2. Acute vaginitis  Vaginitis DNA Probe                        PLAN:  - Discussed new papsmear guidelines. - Birth control Discussed. - Smoking risk factors Discussed  - Diet and exercise reviewed. - Routine healthmaintenance per patients PCP.  - Return to clinic in 1 year or earlier with questions, problems, concerns. No follow-ups on file.         Electronically signed by BRIAN Dalal CNP on 8/29/2022 at 4:02 PM

## 2022-08-30 DIAGNOSIS — A59.9 TRICHIMONIASIS: Primary | ICD-10-CM

## 2022-08-30 DIAGNOSIS — B96.89 BV (BACTERIAL VAGINOSIS): ICD-10-CM

## 2022-08-30 DIAGNOSIS — N76.0 BV (BACTERIAL VAGINOSIS): ICD-10-CM

## 2022-08-30 LAB
CANDIDA SPECIES, DNA PROBE: NEGATIVE
GARDNERELLA VAGINALIS, DNA PROBE: POSITIVE
SOURCE: ABNORMAL
TRICHOMONAS VAGINALIS DNA: POSITIVE

## 2022-08-30 RX ORDER — METRONIDAZOLE 500 MG/1
500 TABLET ORAL 2 TIMES DAILY
Qty: 14 TABLET | Refills: 0 | Status: SHIPPED | OUTPATIENT
Start: 2022-08-30 | End: 2022-09-06

## 2022-09-09 LAB — CYTOLOGY REPORT: NORMAL

## 2023-10-09 ENCOUNTER — OFFICE VISIT (OUTPATIENT)
Dept: OBGYN CLINIC | Age: 23
End: 2023-10-09
Payer: COMMERCIAL

## 2023-10-09 ENCOUNTER — HOSPITAL ENCOUNTER (OUTPATIENT)
Age: 23
Discharge: HOME | End: 2023-10-09
Payer: COMMERCIAL

## 2023-10-09 VITALS
BODY MASS INDEX: 29.98 KG/M2 | HEIGHT: 67 IN | DIASTOLIC BLOOD PRESSURE: 78 MMHG | SYSTOLIC BLOOD PRESSURE: 126 MMHG | WEIGHT: 191 LBS

## 2023-10-09 DIAGNOSIS — N91.2 AMENORRHEA: Primary | ICD-10-CM

## 2023-10-09 DIAGNOSIS — N91.2 AMENORRHEA: ICD-10-CM

## 2023-10-09 LAB — B-HCG SERPL EIA 3RD IS-ACNC: 3190 MIU/ML

## 2023-10-09 PROCEDURE — 4004F PT TOBACCO SCREEN RCVD TLK: CPT | Performed by: NURSE PRACTITIONER

## 2023-10-09 PROCEDURE — 36415 COLL VENOUS BLD VENIPUNCTURE: CPT

## 2023-10-09 PROCEDURE — 99203 OFFICE O/P NEW LOW 30 MIN: CPT | Performed by: NURSE PRACTITIONER

## 2023-10-09 PROCEDURE — G8427 DOCREV CUR MEDS BY ELIG CLIN: HCPCS | Performed by: NURSE PRACTITIONER

## 2023-10-09 PROCEDURE — G8417 CALC BMI ABV UP PARAM F/U: HCPCS | Performed by: NURSE PRACTITIONER

## 2023-10-09 PROCEDURE — 84702 CHORIONIC GONADOTROPIN TEST: CPT

## 2023-10-09 PROCEDURE — G8484 FLU IMMUNIZE NO ADMIN: HCPCS | Performed by: NURSE PRACTITIONER

## 2023-10-09 RX ORDER — PNV NO.95/FERROUS FUM/FOLIC AC 28MG-0.8MG
1 TABLET ORAL DAILY
Qty: 30 TABLET | Refills: 11 | Status: SHIPPED | OUTPATIENT
Start: 2023-10-09

## 2023-10-09 RX ORDER — PYRIDOXINE HCL (VITAMIN B6) 50 MG
50 TABLET ORAL 2 TIMES DAILY
Qty: 60 TABLET | Refills: 3 | Status: SHIPPED | OUTPATIENT
Start: 2023-10-09 | End: 2023-11-08

## 2023-10-09 NOTE — PROGRESS NOTES
Pablo Galvin  10/9/2023    YOB: 2000          The patient was seen today. She is here regarding early pregnancy, missed menses. Arrives with significant other, Sophia Marcelo. LMP 8/3/2023. Had vaginal spotting in September for 5 days. Planned pregnancy. Hx of 2019. Hx of depression, anxiety and bipolar. Stopped prozac and seroquel 2 weeks ago. Currently seeing therapist every month. Her bowels are regular and she is voiding without difficulty. HPI:  Pablo Galvin is a 26 y.o. female      Missed menses      OB History    Para Term  AB Living   2 0 0 0 1 0   SAB IAB Ectopic Molar Multiple Live Births   1 0 0 0 0 0      # Outcome Date GA Lbr Guille/2nd Weight Sex Delivery Anes PTL Lv   2 2019           1                 Past Medical History:   Diagnosis Date    Depression     Bipolar       History reviewed. No pertinent surgical history.     Family History   Problem Relation Age of Onset    Stroke Paternal Grandfather     Hypertension Father     Diabetes Father     Diabetes Mother     Hypertension Mother     Breast Cancer Mother        Social History     Socioeconomic History    Marital status: Single     Spouse name: Not on file    Number of children: Not on file    Years of education: Not on file    Highest education level: Not on file   Occupational History    Not on file   Tobacco Use    Smoking status: Every Day     Packs/day: 1     Types: Cigarettes    Smokeless tobacco: Never   Vaping Use    Vaping Use: Never used   Substance and Sexual Activity    Alcohol use: Yes     Comment: occ    Drug use: Yes     Types: Marijuana Gita Lucas    Sexual activity: Yes     Partners: Female   Other Topics Concern    Not on file   Social History Narrative    Not on file     Social Determinants of Health     Financial Resource Strain: Low Risk  (2022)    Overall Financial Resource Strain (CARDIA)     Difficulty of Paying Living Expenses: Not hard at all   Food

## 2023-10-10 ENCOUNTER — TELEPHONE (OUTPATIENT)
Dept: OBGYN CLINIC | Age: 23
End: 2023-10-10

## 2023-10-10 DIAGNOSIS — Z34.90 EARLY STAGE OF PREGNANCY: Primary | ICD-10-CM

## 2023-10-10 NOTE — TELEPHONE ENCOUNTER
----- Message from BRIAN Macedo CNP sent at 10/10/2023  7:55 AM EDT -----  Please schedule new OB intake/ ultrasound. Prenatal vitamin 1 po QD with 12 refills.

## 2023-10-24 ENCOUNTER — INITIAL PRENATAL (OUTPATIENT)
Dept: OBGYN CLINIC | Age: 23
End: 2023-10-24

## 2023-10-24 ENCOUNTER — PROCEDURE VISIT (OUTPATIENT)
Dept: OBGYN CLINIC | Age: 23
End: 2023-10-24
Payer: COMMERCIAL

## 2023-10-24 VITALS
SYSTOLIC BLOOD PRESSURE: 118 MMHG | HEART RATE: 88 BPM | WEIGHT: 191 LBS | BODY MASS INDEX: 29.91 KG/M2 | DIASTOLIC BLOOD PRESSURE: 60 MMHG

## 2023-10-24 DIAGNOSIS — O09.91 SUPERVISION OF HIGH RISK PREGNANCY IN FIRST TRIMESTER: Primary | ICD-10-CM

## 2023-10-24 DIAGNOSIS — Z34.90 EARLY STAGE OF PREGNANCY: Primary | ICD-10-CM

## 2023-10-24 DIAGNOSIS — Z34.90 EARLY STAGE OF PREGNANCY: ICD-10-CM

## 2023-10-24 DIAGNOSIS — Z3A.01 7 WEEKS GESTATION OF PREGNANCY: ICD-10-CM

## 2023-10-24 LAB
CRL: NORMAL
SAC DIAMETER: NORMAL

## 2023-10-24 PROCEDURE — 99999 PR OFFICE/OUTPT VISIT,PROCEDURE ONLY: CPT | Performed by: OBSTETRICS & GYNECOLOGY

## 2023-10-24 PROCEDURE — 76801 OB US < 14 WKS SINGLE FETUS: CPT | Performed by: OBSTETRICS & GYNECOLOGY

## 2023-10-24 PROCEDURE — NBSRV NON-BILLABLE SERVICE: Performed by: NURSE PRACTITIONER

## 2023-10-24 NOTE — PROGRESS NOTES
Patient presents to office for OB intake, nurse visit only. Intake completed following ultrasound in office to confirm pregnancy dating/viability. Based on ultrasound, patient is currently 7w2d  gestation, Estimated Date of Delivery: 6/9/24. Patient presents to intake FOB. This was an planned pregnancy. Patient currently taking has a current medication list which includes the following prescription(s): prenatal vitamins and pyridoxine. . Patient's medical, surgical, obstetrical and family history reviewed. See HER for details. Patient prenatal lab work given to patient at this visit as well as OB education material. New OB consent forms signed. Patient accepted first trimester screening. Cystic Fibrosis screening ordered as well as an early 1hr due to Family hx DM. Referral placed to [facility] for first trimester screen. Patient scheduled for follow up OB appointment with Emiliana Gardner CNP 11/15/23. Patient instructed to complete lab work prior to follow up OB appointment.

## 2023-10-30 ENCOUNTER — HOSPITAL ENCOUNTER (OUTPATIENT)
Age: 23
Setting detail: SPECIMEN
Discharge: HOME | End: 2023-10-30
Payer: COMMERCIAL

## 2023-10-30 DIAGNOSIS — Z3A.01 7 WEEKS GESTATION OF PREGNANCY: ICD-10-CM

## 2023-10-30 DIAGNOSIS — Z34.90 EARLY STAGE OF PREGNANCY: ICD-10-CM

## 2023-10-30 LAB
ABO + RH BLD: NORMAL
BASOPHILS # BLD: 0.06 K/UL (ref 0–0.2)
BASOPHILS NFR BLD: 0 % (ref 0–2)
BILIRUB UR QL STRIP: NEGATIVE
BLOOD GROUP ANTIBODIES SERPL: NEGATIVE
CLARITY UR: CLEAR
COLOR UR: YELLOW
COMMENT: ABNORMAL
EOSINOPHIL # BLD: 0.11 K/UL (ref 0–0.44)
EOSINOPHILS RELATIVE PERCENT: 1 % (ref 1–4)
ERYTHROCYTE [DISTWIDTH] IN BLOOD BY AUTOMATED COUNT: 12.5 % (ref 11.8–14.4)
GLUCOSE UR STRIP-MCNC: NEGATIVE MG/DL
HBV SURFACE AG SERPL QL IA: NONREACTIVE
HCT VFR BLD AUTO: 40.2 % (ref 36.3–47.1)
HGB BLD-MCNC: 13.6 G/DL (ref 11.9–15.1)
HGB UR QL STRIP.AUTO: NEGATIVE
IMM GRANULOCYTES # BLD AUTO: 0.06 K/UL (ref 0–0.3)
IMM GRANULOCYTES NFR BLD: 0 %
KETONES UR STRIP-MCNC: ABNORMAL MG/DL
LEUKOCYTE ESTERASE UR QL STRIP: NEGATIVE
LYMPHOCYTES NFR BLD: 3.42 K/UL (ref 1.1–3.7)
LYMPHOCYTES RELATIVE PERCENT: 24 % (ref 24–43)
MCH RBC QN AUTO: 30.4 PG (ref 25.2–33.5)
MCHC RBC AUTO-ENTMCNC: 33.8 G/DL (ref 28.4–34.8)
MCV RBC AUTO: 89.9 FL (ref 82.6–102.9)
MONOCYTES NFR BLD: 0.64 K/UL (ref 0.1–1.2)
MONOCYTES NFR BLD: 4 % (ref 3–12)
NEUTROPHILS NFR BLD: 71 % (ref 36–65)
NEUTS SEG NFR BLD: 10.13 K/UL (ref 1.5–8.1)
NITRITE UR QL STRIP: NEGATIVE
NRBC BLD-RTO: 0 PER 100 WBC
PH UR STRIP: 7 [PH] (ref 5–8)
PLATELET # BLD AUTO: 267 K/UL (ref 138–453)
PMV BLD AUTO: 9.3 FL (ref 8.1–13.5)
PROT UR STRIP-MCNC: NEGATIVE MG/DL
RBC # BLD AUTO: 4.47 M/UL (ref 3.95–5.11)
RUBV IGG SERPL QL IA: 163.1 IU/ML
SP GR UR STRIP: 1.03 (ref 1–1.03)
TSH SERPL DL<=0.05 MIU/L-ACNC: 0.78 UIU/ML (ref 0.3–5)
UROBILINOGEN UR STRIP-ACNC: NORMAL EU/DL (ref 0–1)
WBC OTHER # BLD: 14.4 K/UL (ref 3.5–11.3)

## 2023-10-30 PROCEDURE — 86778 TOXOPLASMA ANTIBODY IGM: CPT

## 2023-10-30 PROCEDURE — 86777 TOXOPLASMA ANTIBODY: CPT

## 2023-10-30 PROCEDURE — 87340 HEPATITIS B SURFACE AG IA: CPT

## 2023-10-30 PROCEDURE — 81003 URINALYSIS AUTO W/O SCOPE: CPT

## 2023-10-30 PROCEDURE — 84443 ASSAY THYROID STIM HORMONE: CPT

## 2023-10-30 PROCEDURE — 86780 TREPONEMA PALLIDUM: CPT

## 2023-10-30 PROCEDURE — 81220 CFTR GENE COM VARIANTS: CPT

## 2023-10-30 PROCEDURE — 86850 RBC ANTIBODY SCREEN: CPT

## 2023-10-30 PROCEDURE — 82950 GLUCOSE TEST: CPT

## 2023-10-30 PROCEDURE — 86901 BLOOD TYPING SEROLOGIC RH(D): CPT

## 2023-10-30 PROCEDURE — 85025 COMPLETE CBC W/AUTO DIFF WBC: CPT

## 2023-10-30 PROCEDURE — 36415 COLL VENOUS BLD VENIPUNCTURE: CPT

## 2023-10-30 PROCEDURE — 87389 HIV-1 AG W/HIV-1&-2 AB AG IA: CPT

## 2023-10-30 PROCEDURE — 86900 BLOOD TYPING SEROLOGIC ABO: CPT

## 2023-10-30 PROCEDURE — 86762 RUBELLA ANTIBODY: CPT

## 2023-10-31 PROBLEM — O26.899 RH NEGATIVE STATE IN ANTEPARTUM PERIOD: Status: ACTIVE | Noted: 2023-10-31

## 2023-10-31 PROBLEM — Z67.91 RH NEGATIVE STATE IN ANTEPARTUM PERIOD: Status: ACTIVE | Noted: 2023-10-31

## 2023-10-31 LAB
GLUCOSE 1H P 50 G GLC PO SERPL-MCNC: 114 MG/DL (ref 70–135)
GLUCOSE ADMINISTRATION: NORMAL
HIV 1+2 AB+HIV1 P24 AG SERPL QL IA: NONREACTIVE
T PALLIDUM AB SER QL IA: NONREACTIVE

## 2023-11-01 LAB
T GONDII IGG SER-ACNC: <0.5 IU/ML
T GONDII IGM SER-ACNC: 0.24 INDEX

## 2023-11-07 LAB
CFTR ALLELE 1 BLD/T QL: NEGATIVE
CFTR ALLELE 2 BLD/T QL: NEGATIVE
CFTR MUT ANL BLD/T: NORMAL
CFTR MUT ANL BLD/T: NORMAL

## 2023-11-15 ENCOUNTER — ROUTINE PRENATAL (OUTPATIENT)
Dept: OBGYN CLINIC | Age: 23
End: 2023-11-15
Payer: COMMERCIAL

## 2023-11-15 ENCOUNTER — HOSPITAL ENCOUNTER (OUTPATIENT)
Age: 23
Setting detail: SPECIMEN
Discharge: HOME | End: 2023-11-15

## 2023-11-15 VITALS
SYSTOLIC BLOOD PRESSURE: 122 MMHG | WEIGHT: 193 LBS | DIASTOLIC BLOOD PRESSURE: 68 MMHG | HEART RATE: 80 BPM | BODY MASS INDEX: 30.23 KG/M2

## 2023-11-15 DIAGNOSIS — Z3A.10 10 WEEKS GESTATION OF PREGNANCY: ICD-10-CM

## 2023-11-15 DIAGNOSIS — O26.899 RH NEGATIVE STATE IN ANTEPARTUM PERIOD: Primary | ICD-10-CM

## 2023-11-15 DIAGNOSIS — Z34.90 SECOND PREGNANCY: ICD-10-CM

## 2023-11-15 DIAGNOSIS — O26.899 RH NEGATIVE STATE IN ANTEPARTUM PERIOD: ICD-10-CM

## 2023-11-15 DIAGNOSIS — Z67.91 RH NEGATIVE STATE IN ANTEPARTUM PERIOD: ICD-10-CM

## 2023-11-15 DIAGNOSIS — Z67.91 RH NEGATIVE STATE IN ANTEPARTUM PERIOD: Primary | ICD-10-CM

## 2023-11-15 PROCEDURE — G8484 FLU IMMUNIZE NO ADMIN: HCPCS | Performed by: NURSE PRACTITIONER

## 2023-11-15 PROCEDURE — 1036F TOBACCO NON-USER: CPT | Performed by: NURSE PRACTITIONER

## 2023-11-15 PROCEDURE — 99214 OFFICE O/P EST MOD 30 MIN: CPT | Performed by: NURSE PRACTITIONER

## 2023-11-15 PROCEDURE — G8417 CALC BMI ABV UP PARAM F/U: HCPCS | Performed by: NURSE PRACTITIONER

## 2023-11-15 PROCEDURE — G8427 DOCREV CUR MEDS BY ELIG CLIN: HCPCS | Performed by: NURSE PRACTITIONER

## 2023-11-15 NOTE — PROGRESS NOTES
herself, the father of the baby or their families. SHE DENIED ANY HISTORY AS STATED ABOVE: Yes    Upon completion of the visit all questions were answered and the patients follow-up and testing schedule were reviewed. Prenatal vitamins were given. T-dap Vaccine recommendations reviewed with the patient. Patient notified of timing of vaccination 27-36 weeks gestation. Patient aware Vaccine is NOT Live. Yes. The patient, Karly Witt is a 26 y.o. female, was seen with a total time spent of 30 minutes for the visit on this date of service by the E/M provider. The time component had both face to face and non face to face time spent in determining the total time component. Counseling and education regarding her diagnosis listed below and her options regarding those diagnoses were also included in determining her time component. Diagnosis Orders   1. Rh negative state in antepartum period  PAP SMEAR    C.trachomatis N.gonorrhoeae DNA    Culture, Genital      2. 10 weeks gestation of pregnancy  PAP SMEAR    C.trachomatis N.gonorrhoeae DNA    Culture, Genital      3. Second pregnancy             The patient had her preventative health maintenance recommendations and follow-up reviewed with her at the completion of her visit.

## 2023-11-16 LAB
C TRACH DNA SPEC QL PROBE+SIG AMP: NEGATIVE
N GONORRHOEA DNA SPEC QL PROBE+SIG AMP: NEGATIVE
SPECIMEN DESCRIPTION: NORMAL

## 2023-11-19 LAB
MICROORGANISM SPEC CULT: NORMAL
SPECIMEN DESCRIPTION: NORMAL

## 2023-12-02 LAB — CYTOLOGY REPORT: NORMAL

## 2023-12-11 SDOH — ECONOMIC STABILITY: FOOD INSECURITY: WITHIN THE PAST 12 MONTHS, THE FOOD YOU BOUGHT JUST DIDN'T LAST AND YOU DIDN'T HAVE MONEY TO GET MORE.: PATIENT DECLINED

## 2023-12-11 SDOH — ECONOMIC STABILITY: TRANSPORTATION INSECURITY
IN THE PAST 12 MONTHS, HAS LACK OF TRANSPORTATION KEPT YOU FROM MEETINGS, WORK, OR FROM GETTING THINGS NEEDED FOR DAILY LIVING?: PATIENT DECLINED

## 2023-12-11 SDOH — ECONOMIC STABILITY: HOUSING INSECURITY
IN THE LAST 12 MONTHS, WAS THERE A TIME WHEN YOU DID NOT HAVE A STEADY PLACE TO SLEEP OR SLEPT IN A SHELTER (INCLUDING NOW)?: PATIENT REFUSED

## 2023-12-11 SDOH — ECONOMIC STABILITY: INCOME INSECURITY: HOW HARD IS IT FOR YOU TO PAY FOR THE VERY BASICS LIKE FOOD, HOUSING, MEDICAL CARE, AND HEATING?: PATIENT DECLINED

## 2023-12-11 SDOH — ECONOMIC STABILITY: FOOD INSECURITY: WITHIN THE PAST 12 MONTHS, YOU WORRIED THAT YOUR FOOD WOULD RUN OUT BEFORE YOU GOT MONEY TO BUY MORE.: PATIENT DECLINED

## 2023-12-13 ENCOUNTER — ROUTINE PRENATAL (OUTPATIENT)
Dept: OBGYN CLINIC | Age: 23
End: 2023-12-13
Payer: COMMERCIAL

## 2023-12-13 VITALS
WEIGHT: 190 LBS | HEART RATE: 99 BPM | SYSTOLIC BLOOD PRESSURE: 112 MMHG | BODY MASS INDEX: 29.76 KG/M2 | DIASTOLIC BLOOD PRESSURE: 60 MMHG

## 2023-12-13 DIAGNOSIS — O09.92 SUPERVISION OF HIGH RISK PREGNANCY IN SECOND TRIMESTER: Primary | ICD-10-CM

## 2023-12-13 DIAGNOSIS — Z67.91 RH NEGATIVE STATE IN ANTEPARTUM PERIOD: ICD-10-CM

## 2023-12-13 DIAGNOSIS — Z3A.14 14 WEEKS GESTATION OF PREGNANCY: Primary | ICD-10-CM

## 2023-12-13 DIAGNOSIS — O26.899 RH NEGATIVE STATE IN ANTEPARTUM PERIOD: ICD-10-CM

## 2023-12-13 PROCEDURE — G8417 CALC BMI ABV UP PARAM F/U: HCPCS | Performed by: OBSTETRICS & GYNECOLOGY

## 2023-12-13 PROCEDURE — G8484 FLU IMMUNIZE NO ADMIN: HCPCS | Performed by: OBSTETRICS & GYNECOLOGY

## 2023-12-13 PROCEDURE — G8427 DOCREV CUR MEDS BY ELIG CLIN: HCPCS | Performed by: OBSTETRICS & GYNECOLOGY

## 2023-12-13 PROCEDURE — 1036F TOBACCO NON-USER: CPT | Performed by: OBSTETRICS & GYNECOLOGY

## 2023-12-13 PROCEDURE — 99213 OFFICE O/P EST LOW 20 MIN: CPT | Performed by: OBSTETRICS & GYNECOLOGY

## 2023-12-13 NOTE — PROGRESS NOTES
Daina Calloway is a 26 y.o. female 14w3d        OB History    Para Term  AB Living   2       1 0   SAB IAB Ectopic Molar Multiple Live Births   1                # Outcome Date GA Lbr Guille/2nd Weight Sex Delivery Anes PTL Lv   2 Current            1 2019                     Vitals  BP: 112/60  Weight - Scale: 86.2 kg (190 lb)  Pulse: 99  Albumin: Negative  Glucose: Negative      The patient was seen and evaluated. There was Positive fetal movements. No contractions or leakage of fluid. Signs and symptoms of  labor as well as labor were reviewed. The Nuchal Translucency testing was reviewed and found to be  pt declined . A single marker MSAFP was declined for a 15-20 week gestational age window. TOP ST OH Reviewed. Dates were reviewed with the patient. An 18-22 week anatomy ultrasound has been ordered. The patient will return to the office for her next visit in 4 weeks. See antepartum flow sheet. 23  PRAF done      Assessment:  1. Daina Calloway is a 26 y.o. female  2.   3. 14w3d    Patient Active Problem List    Diagnosis Date Noted    Rh negative state in antepartum period 10/31/2023     Priority: High     10/31/2023 rhogam at 28 weeks      Vaginal cyst 2022     Priority: Medium    Bacterial vaginosis 2022     Priority: Medium    Second pregnancy 11/15/2023    Major depressive disorder, recurrent ([address]) 2020    MDD (major depressive disorder), recurrent, severe, with psychosis ([address]) 2020    Major depressive disorder, recurrent, severe with psychotic features ([address])     Complex posttraumatic stress disorder         Diagnosis Orders   1. 14 weeks gestation of pregnancy  Maternal screen 4      2. Rh negative state in antepartum period              Plan:  Anatomy sono  MSAFP        Patient was seen with total face to face time of 20 minutes.  More than 50% of this visit was on counseling and education regarding her    Diagnosis Orders   1. 14 weeks

## 2023-12-20 PROBLEM — Z98.84 S/P BARIATRIC SURGERY: Status: ACTIVE | Noted: 2023-12-20

## 2023-12-20 PROBLEM — I10 CHRONIC HYPERTENSION: Status: ACTIVE | Noted: 2023-12-20

## 2024-01-10 ENCOUNTER — ROUTINE PRENATAL (OUTPATIENT)
Dept: OBGYN CLINIC | Age: 24
End: 2024-01-10
Payer: COMMERCIAL

## 2024-01-10 VITALS
DIASTOLIC BLOOD PRESSURE: 76 MMHG | HEART RATE: 99 BPM | SYSTOLIC BLOOD PRESSURE: 120 MMHG | WEIGHT: 201 LBS | BODY MASS INDEX: 31.48 KG/M2

## 2024-01-10 DIAGNOSIS — Z98.84 S/P BARIATRIC SURGERY: ICD-10-CM

## 2024-01-10 DIAGNOSIS — Z3A.18 18 WEEKS GESTATION OF PREGNANCY: ICD-10-CM

## 2024-01-10 DIAGNOSIS — I10 CHRONIC HYPERTENSION: Primary | ICD-10-CM

## 2024-01-10 DIAGNOSIS — O26.899 RH NEGATIVE STATE IN ANTEPARTUM PERIOD: ICD-10-CM

## 2024-01-10 DIAGNOSIS — Z67.91 RH NEGATIVE STATE IN ANTEPARTUM PERIOD: ICD-10-CM

## 2024-01-10 PROCEDURE — 3078F DIAST BP <80 MM HG: CPT | Performed by: NURSE PRACTITIONER

## 2024-01-10 PROCEDURE — 3074F SYST BP LT 130 MM HG: CPT | Performed by: NURSE PRACTITIONER

## 2024-01-10 PROCEDURE — G8427 DOCREV CUR MEDS BY ELIG CLIN: HCPCS | Performed by: NURSE PRACTITIONER

## 2024-01-10 PROCEDURE — G8484 FLU IMMUNIZE NO ADMIN: HCPCS | Performed by: NURSE PRACTITIONER

## 2024-01-10 PROCEDURE — G8417 CALC BMI ABV UP PARAM F/U: HCPCS | Performed by: NURSE PRACTITIONER

## 2024-01-10 PROCEDURE — 99213 OFFICE O/P EST LOW 20 MIN: CPT | Performed by: NURSE PRACTITIONER

## 2024-01-10 PROCEDURE — 1036F TOBACCO NON-USER: CPT | Performed by: NURSE PRACTITIONER

## 2024-01-10 NOTE — PROGRESS NOTES
Carmen Amor is a 23 y.o. female 18w3d        OB History    Para Term  AB Living   2       1 0   SAB IAB Ectopic Molar Multiple Live Births   1                # Outcome Date GA Lbr Guille/2nd Weight Sex Delivery Anes PTL Lv   2 Current            1 2019               Vitals  BP: 120/76  Weight - Scale: 91.2 kg (201 lb)  Pulse: 99  Patient Position: Sitting  Albumin: Negative  Glucose: Negative    The patient was seen and evaluated. There was positive fetal movements. No contractions or leakage of fluid. Signs and symptoms of  labor as well as labor were reviewed.The Nuchal Translucency testing was reviewed and found to be normal A single marker MSAFP was reviewed and found to be declined. The patients anatomy ultrasound has been completed and reviewed with patient. TOP ST OH Reviewed. A 28 week lab panel was ordered. This includes a (HH, 1 hr GTT, U/A C&S). The patient is to complete this in the next two to four weeks.    The S/S of Pre-Eclampsia were reviewed with the patient in detail. She is to report any of these if they occur. She currently denies any of these.    The patient is RH negative Rhogam Ordered no- to be ordered with 28 week labs     The patient was instructed on fetal kick counts and was given a kick sheet to complete every 8 hours. This is to begin at 28 weeks gestation. She was instructed that the baby should move at a minimum of ten times within one hour after a meal. The patient was instructed to lay down on her left side twenty minutes after eating and count movements for up to one hour with a target value of ten movements.  She was instructed to notify the office if she did not make that target after two attempts or if after any attempt there was less than four movements.      2023 Daily baby ASA 81mg PO for prevention of preeclampsia in pregnant women at high risk recommended by USPSTF/ACOG.   2023 Advise fetal  surveillance from 32 weeks

## 2024-02-28 DIAGNOSIS — O09.92 SUPERVISION OF HIGH RISK PREGNANCY IN SECOND TRIMESTER: Primary | ICD-10-CM

## 2024-02-28 PROBLEM — F43.10 POST-TRAUMATIC STRESS DISORDER, UNSPECIFIED: Status: ACTIVE | Noted: 2023-06-27

## 2024-02-28 PROBLEM — F31.5 BIPOLAR I DISORDER, SEVERE, CURRENT OR MOST RECENT EPISODE DEPRESSED, WITH PSYCHOTIC FEATURES (HCC): Status: ACTIVE | Noted: 2023-06-27

## 2024-02-28 PROBLEM — F60.3 BORDERLINE PERSONALITY DISORDER (HCC): Status: ACTIVE | Noted: 2023-06-27

## 2024-02-29 ENCOUNTER — ROUTINE PRENATAL (OUTPATIENT)
Dept: OBGYN CLINIC | Age: 24
End: 2024-02-29
Payer: COMMERCIAL

## 2024-02-29 VITALS
SYSTOLIC BLOOD PRESSURE: 118 MMHG | DIASTOLIC BLOOD PRESSURE: 76 MMHG | BODY MASS INDEX: 31.48 KG/M2 | WEIGHT: 201 LBS | HEART RATE: 97 BPM

## 2024-02-29 DIAGNOSIS — O09.92 HIGH-RISK PREGNANCY IN SECOND TRIMESTER: Primary | ICD-10-CM

## 2024-02-29 DIAGNOSIS — Z67.91 RH NEGATIVE STATE IN ANTEPARTUM PERIOD: ICD-10-CM

## 2024-02-29 DIAGNOSIS — Z3A.25 25 WEEKS GESTATION OF PREGNANCY: ICD-10-CM

## 2024-02-29 DIAGNOSIS — Z98.84 S/P BARIATRIC SURGERY: ICD-10-CM

## 2024-02-29 DIAGNOSIS — O26.899 RH NEGATIVE STATE IN ANTEPARTUM PERIOD: ICD-10-CM

## 2024-02-29 DIAGNOSIS — I10 CHRONIC HYPERTENSION: ICD-10-CM

## 2024-02-29 PROCEDURE — 1036F TOBACCO NON-USER: CPT | Performed by: OBSTETRICS & GYNECOLOGY

## 2024-02-29 PROCEDURE — 3074F SYST BP LT 130 MM HG: CPT | Performed by: OBSTETRICS & GYNECOLOGY

## 2024-02-29 PROCEDURE — 99214 OFFICE O/P EST MOD 30 MIN: CPT | Performed by: OBSTETRICS & GYNECOLOGY

## 2024-02-29 PROCEDURE — G8428 CUR MEDS NOT DOCUMENT: HCPCS | Performed by: OBSTETRICS & GYNECOLOGY

## 2024-02-29 PROCEDURE — G8484 FLU IMMUNIZE NO ADMIN: HCPCS | Performed by: OBSTETRICS & GYNECOLOGY

## 2024-02-29 PROCEDURE — G8417 CALC BMI ABV UP PARAM F/U: HCPCS | Performed by: OBSTETRICS & GYNECOLOGY

## 2024-02-29 PROCEDURE — 3078F DIAST BP <80 MM HG: CPT | Performed by: OBSTETRICS & GYNECOLOGY

## 2024-02-29 NOTE — PROGRESS NOTES
Carmen Amor is a 24 y.o. female 25w4d        OB History    Para Term  AB Living   2       1 0   SAB IAB Ectopic Molar Multiple Live Births   1                # Outcome Date GA Lbr Guille/2nd Weight Sex Delivery Anes PTL Lv   2 Current            1 2019               Vitals  BP: 118/76  Weight - Scale: 91.2 kg (201 lb)  Pulse: 97  Patient Position: Sitting  Albumin: Negative  Glucose: Negative    The patient was seen and evaluated. There was positive fetal movements. No contractions or leakage of fluid. Signs and symptoms of  labor as well as labor were reviewed.The Nuchal Translucency testing was reviewed and found to be declined A single marker MSAFP was reviewed and found to be declined. The patients anatomy ultrasound has been completed and reviewed with patient. TOP  OH Reviewed. A 28 week lab panel was ordered. This includes a (HH, 1 hr GTT, U/A C&S). The patient is to complete this in the next two to four weeks.    The following was discussed:  A. Counseling on the incidents of birth defects in the general population being 2-4% and that ultrasound does not diagnose every form of congenital abnormality and has limitations .   B. The only way to evaluate the chromosomal makeup to near 100% certainty is with invasive testing such as chorionic villous sampling or amniocentesis.   C. The options for testing listed in (B) with detail and all risks/benefits and alternatives will be discussed in the high risk setting.   D. NIPT testing options will be discussed with the patient, taking a maternal blood sample and screening it for fetal cells then performing a genetic karyotype.  If this were to be positive for                    a trisomy then a confirmatory amniocentesis or [facility] for confirmation would be needed.    E. TOPSTOH guidelines will be reviewed with the patient.      The S/S of Pre-Eclampsia were reviewed with the patient in detail. She is to report any of these if

## 2024-03-04 ENCOUNTER — HOSPITAL ENCOUNTER (OUTPATIENT)
Age: 24
Discharge: HOME | End: 2024-03-04
Attending: OBSTETRICS & GYNECOLOGY | Admitting: OBSTETRICS & GYNECOLOGY
Payer: COMMERCIAL

## 2024-03-04 ENCOUNTER — HOSPITAL ENCOUNTER (OUTPATIENT)
Age: 24
Discharge: HOME | End: 2024-03-04
Payer: COMMERCIAL

## 2024-03-04 VITALS
DIASTOLIC BLOOD PRESSURE: 73 MMHG | SYSTOLIC BLOOD PRESSURE: 137 MMHG | HEART RATE: 101 BPM | RESPIRATION RATE: 20 BRPM | TEMPERATURE: 97.7 F

## 2024-03-04 DIAGNOSIS — O09.92 HIGH-RISK PREGNANCY IN SECOND TRIMESTER: ICD-10-CM

## 2024-03-04 DIAGNOSIS — I10 CHRONIC HYPERTENSION: ICD-10-CM

## 2024-03-04 DIAGNOSIS — Z67.91 RH NEGATIVE STATE IN ANTEPARTUM PERIOD: ICD-10-CM

## 2024-03-04 DIAGNOSIS — O26.899 RH NEGATIVE STATE IN ANTEPARTUM PERIOD: ICD-10-CM

## 2024-03-04 LAB
BACTERIA URNS QL MICRO: ABNORMAL
BASOPHILS # BLD: 0.03 K/UL (ref 0–0.2)
BASOPHILS NFR BLD: 0 % (ref 0–2)
BILIRUB UR QL STRIP: NEGATIVE
BLOOD GROUP ANTIBODIES SERPL: NEGATIVE
CASTS #/AREA URNS LPF: ABNORMAL /LPF (ref 0–8)
CLARITY UR: ABNORMAL
COLOR UR: YELLOW
EOSINOPHIL # BLD: 0.06 K/UL (ref 0–0.44)
EOSINOPHILS RELATIVE PERCENT: 1 % (ref 1–4)
EPI CELLS #/AREA URNS HPF: ABNORMAL /HPF (ref 0–5)
ERYTHROCYTE [DISTWIDTH] IN BLOOD BY AUTOMATED COUNT: 12.7 % (ref 11.8–14.4)
GLUCOSE 1H P 50 G GLC PO SERPL-MCNC: 127 MG/DL (ref 70–135)
GLUCOSE ADMINISTRATION: NORMAL
GLUCOSE UR STRIP-MCNC: NEGATIVE MG/DL
HCT VFR BLD AUTO: 34.8 % (ref 36.3–47.1)
HGB BLD-MCNC: 11.8 G/DL (ref 11.9–15.1)
HGB UR QL STRIP.AUTO: NEGATIVE
IMM GRANULOCYTES # BLD AUTO: 0.09 K/UL (ref 0–0.3)
IMM GRANULOCYTES NFR BLD: 1 %
KETONES UR STRIP-MCNC: NEGATIVE MG/DL
LEUKOCYTE ESTERASE UR QL STRIP: NEGATIVE
LYMPHOCYTES NFR BLD: 2.7 K/UL (ref 1.1–3.7)
LYMPHOCYTES RELATIVE PERCENT: 21 % (ref 24–43)
MCH RBC QN AUTO: 30.5 PG (ref 25.2–33.5)
MCHC RBC AUTO-ENTMCNC: 33.9 G/DL (ref 28.4–34.8)
MCV RBC AUTO: 89.9 FL (ref 82.6–102.9)
MONOCYTES NFR BLD: 0.51 K/UL (ref 0.1–1.2)
MONOCYTES NFR BLD: 4 % (ref 3–12)
NEUTROPHILS NFR BLD: 73 % (ref 36–65)
NEUTS SEG NFR BLD: 9.51 K/UL (ref 1.5–8.1)
NITRITE UR QL STRIP: NEGATIVE
NRBC BLD-RTO: 0 PER 100 WBC
PH UR STRIP: 7 [PH] (ref 5–8)
PLATELET # BLD AUTO: 195 K/UL (ref 138–453)
PMV BLD AUTO: 9.3 FL (ref 8.1–13.5)
PROT UR STRIP-MCNC: NEGATIVE MG/DL
RBC # BLD AUTO: 3.87 M/UL (ref 3.95–5.11)
RBC #/AREA URNS HPF: ABNORMAL /HPF (ref 0–4)
SP GR UR STRIP: 1.02 (ref 1–1.03)
UROBILINOGEN UR STRIP-ACNC: NORMAL EU/DL (ref 0–1)
WBC #/AREA URNS HPF: ABNORMAL /HPF (ref 0–5)
WBC OTHER # BLD: 12.9 K/UL (ref 3.5–11.3)

## 2024-03-04 PROCEDURE — 96372 THER/PROPH/DIAG INJ SC/IM: CPT

## 2024-03-04 PROCEDURE — 36415 COLL VENOUS BLD VENIPUNCTURE: CPT

## 2024-03-04 PROCEDURE — 86850 RBC ANTIBODY SCREEN: CPT

## 2024-03-04 PROCEDURE — 81001 URINALYSIS AUTO W/SCOPE: CPT

## 2024-03-04 PROCEDURE — 6360000002 HC RX W HCPCS: Performed by: STUDENT IN AN ORGANIZED HEALTH CARE EDUCATION/TRAINING PROGRAM

## 2024-03-04 PROCEDURE — 82950 GLUCOSE TEST: CPT

## 2024-03-04 PROCEDURE — 85025 COMPLETE CBC W/AUTO DIFF WBC: CPT

## 2024-03-04 RX ADMIN — HUMAN RHO(D) IMMUNE GLOBULIN 300 MCG: 300 INJECTION, SOLUTION INTRAMUSCULAR at 17:08

## 2024-03-05 LAB
COMPONENT: NORMAL
STATUS OF UNITS: NORMAL
TRANSFUSION STATUS: NORMAL
UNIT DIVISION: 0
UNIT NUMBER: NORMAL

## 2024-03-12 ENCOUNTER — HOSPITAL ENCOUNTER (OUTPATIENT)
Age: 24
Discharge: HOME | End: 2024-03-12
Payer: COMMERCIAL

## 2024-03-12 ENCOUNTER — ROUTINE PRENATAL (OUTPATIENT)
Dept: PERINATAL CARE | Age: 24
End: 2024-03-12
Payer: COMMERCIAL

## 2024-03-12 VITALS
RESPIRATION RATE: 20 BRPM | TEMPERATURE: 98.2 F | HEIGHT: 63 IN | WEIGHT: 204.81 LBS | DIASTOLIC BLOOD PRESSURE: 84 MMHG | SYSTOLIC BLOOD PRESSURE: 132 MMHG | BODY MASS INDEX: 36.29 KG/M2 | HEART RATE: 91 BPM

## 2024-03-12 DIAGNOSIS — Z3A.27 27 WEEKS GESTATION OF PREGNANCY: ICD-10-CM

## 2024-03-12 DIAGNOSIS — O99.212 OBESITY AFFECTING PREGNANCY IN SECOND TRIMESTER, UNSPECIFIED OBESITY TYPE: ICD-10-CM

## 2024-03-12 DIAGNOSIS — O16.2 HYPERTENSION AFFECTING PREGNANCY IN SECOND TRIMESTER: Primary | ICD-10-CM

## 2024-03-12 LAB
CREAT UR-MCNC: 40.1 MG/DL (ref 28–217)
SEND OUT REPORT: NORMAL
TOTAL PROTEIN, URINE: <4 MG/DL
URINE TOTAL PROTEIN CREATININE RATIO: NORMAL

## 2024-03-12 PROCEDURE — 76811 OB US DETAILED SNGL FETUS: CPT | Performed by: OBSTETRICS & GYNECOLOGY

## 2024-03-12 PROCEDURE — 76820 UMBILICAL ARTERY ECHO: CPT | Performed by: OBSTETRICS & GYNECOLOGY

## 2024-03-12 PROCEDURE — 84156 ASSAY OF PROTEIN URINE: CPT

## 2024-03-12 PROCEDURE — 99999 PR OFFICE/OUTPT VISIT,PROCEDURE ONLY: CPT | Performed by: OBSTETRICS & GYNECOLOGY

## 2024-03-12 PROCEDURE — 76819 FETAL BIOPHYS PROFIL W/O NST: CPT | Performed by: OBSTETRICS & GYNECOLOGY

## 2024-03-12 PROCEDURE — 82570 ASSAY OF URINE CREATININE: CPT

## 2024-03-12 NOTE — PROGRESS NOTES
Obstetric/Gynecology Maternal Fetal Medicine Resident Note    Patient has opted for maternal genetic carrier screening and noninvasive prenatal testing.    Patient declines formal consult with M attending physician for history of chronic hypertension controlled on no medication.     Verónica Lindsay MD  OBGYN Resident, PGY1  [facility]  3/12/2024, 9:14 AM

## 2024-03-12 NOTE — PROGRESS NOTES
Please refer to attached ultrasound report for doctor's evaluation of the clinical information obtained by vital signs, ultrasound, and/or non-stress test along with management recommendation.    Pt did not give a urine specimen at the time of vitals.

## 2024-03-12 NOTE — PROGRESS NOTES
Patient has opted for non-invasive prenatal testing/NIPT (cell-free DNA screening) that is offered to all pregnant patients irrespective of baseline risk or maternal age (Obstet Gynecol 2020; 136; ACOG Practice Bulletin Number 226, Screening for Fetal Chromosomal Abnormalities). Patient has opted for non-invasive prenatal diagnosis testing (with the [employer] Complete test from [employer]) today.     Patient has opted for the Five Gene Carrier Screen (with the [employer] test from [employer]) today.     Patient declines a Maternal-Fetal Medicine complete physician consultation today regarding the fetal and/or maternal medical/obstetrical complications/co-morbidities of pregnancy.      Maternal-Fetal Medicine (MFM) attending physician will defer all management for these medical/obstetrical complications of pregnancy to the primary attending obstetrical physician/provider, as a result.  Therefore, only an ultrasound evaluation was completed today in the MFM office.      Please refer to Maternal-Fetal Medicine OBGYN resident progress note in EPIC.

## 2024-03-21 ENCOUNTER — ROUTINE PRENATAL (OUTPATIENT)
Dept: OBGYN CLINIC | Age: 24
End: 2024-03-21
Payer: COMMERCIAL

## 2024-03-21 VITALS
HEART RATE: 76 BPM | DIASTOLIC BLOOD PRESSURE: 74 MMHG | SYSTOLIC BLOOD PRESSURE: 118 MMHG | WEIGHT: 204 LBS | BODY MASS INDEX: 36.14 KG/M2

## 2024-03-21 DIAGNOSIS — Z67.91 RH NEGATIVE STATE IN ANTEPARTUM PERIOD: ICD-10-CM

## 2024-03-21 DIAGNOSIS — Z3A.28 28 WEEKS GESTATION OF PREGNANCY: ICD-10-CM

## 2024-03-21 DIAGNOSIS — I10 CHRONIC HYPERTENSION: ICD-10-CM

## 2024-03-21 DIAGNOSIS — F60.3 BORDERLINE PERSONALITY DISORDER (HCC): ICD-10-CM

## 2024-03-21 DIAGNOSIS — O09.93 HIGH-RISK PREGNANCY IN THIRD TRIMESTER: Primary | ICD-10-CM

## 2024-03-21 DIAGNOSIS — O26.899 RH NEGATIVE STATE IN ANTEPARTUM PERIOD: ICD-10-CM

## 2024-03-21 PROCEDURE — G8427 DOCREV CUR MEDS BY ELIG CLIN: HCPCS | Performed by: NURSE PRACTITIONER

## 2024-03-21 PROCEDURE — 1036F TOBACCO NON-USER: CPT | Performed by: NURSE PRACTITIONER

## 2024-03-21 PROCEDURE — G8417 CALC BMI ABV UP PARAM F/U: HCPCS | Performed by: NURSE PRACTITIONER

## 2024-03-21 PROCEDURE — 3074F SYST BP LT 130 MM HG: CPT | Performed by: NURSE PRACTITIONER

## 2024-03-21 PROCEDURE — G8484 FLU IMMUNIZE NO ADMIN: HCPCS | Performed by: NURSE PRACTITIONER

## 2024-03-21 PROCEDURE — 99213 OFFICE O/P EST LOW 20 MIN: CPT | Performed by: NURSE PRACTITIONER

## 2024-03-21 PROCEDURE — 3078F DIAST BP <80 MM HG: CPT | Performed by: NURSE PRACTITIONER

## 2024-03-21 NOTE — PROGRESS NOTES
5. BMI 36.0-36.9,adult        6. 28 weeks gestation of pregnancy         and her options. She was also counseled on her preventative health maintenance recommendations and follow-up.

## 2024-04-05 ENCOUNTER — ROUTINE PRENATAL (OUTPATIENT)
Dept: OBGYN CLINIC | Age: 24
End: 2024-04-05
Payer: COMMERCIAL

## 2024-04-05 VITALS
DIASTOLIC BLOOD PRESSURE: 78 MMHG | WEIGHT: 208 LBS | SYSTOLIC BLOOD PRESSURE: 120 MMHG | HEART RATE: 97 BPM | BODY MASS INDEX: 36.85 KG/M2

## 2024-04-05 DIAGNOSIS — F60.3 BORDERLINE PERSONALITY DISORDER (HCC): ICD-10-CM

## 2024-04-05 DIAGNOSIS — I10 CHRONIC HYPERTENSION: ICD-10-CM

## 2024-04-05 DIAGNOSIS — O26.899 RH NEGATIVE STATE IN ANTEPARTUM PERIOD: ICD-10-CM

## 2024-04-05 DIAGNOSIS — Z3A.30 30 WEEKS GESTATION OF PREGNANCY: ICD-10-CM

## 2024-04-05 DIAGNOSIS — O09.93 HIGH-RISK PREGNANCY IN THIRD TRIMESTER: Primary | ICD-10-CM

## 2024-04-05 DIAGNOSIS — Z67.91 RH NEGATIVE STATE IN ANTEPARTUM PERIOD: ICD-10-CM

## 2024-04-05 PROCEDURE — 3078F DIAST BP <80 MM HG: CPT | Performed by: NURSE PRACTITIONER

## 2024-04-05 PROCEDURE — 3074F SYST BP LT 130 MM HG: CPT | Performed by: NURSE PRACTITIONER

## 2024-04-05 PROCEDURE — 99213 OFFICE O/P EST LOW 20 MIN: CPT | Performed by: NURSE PRACTITIONER

## 2024-04-05 PROCEDURE — G8427 DOCREV CUR MEDS BY ELIG CLIN: HCPCS | Performed by: NURSE PRACTITIONER

## 2024-04-05 PROCEDURE — 4004F PT TOBACCO SCREEN RCVD TLK: CPT | Performed by: NURSE PRACTITIONER

## 2024-04-05 PROCEDURE — G8417 CALC BMI ABV UP PARAM F/U: HCPCS | Performed by: NURSE PRACTITIONER

## 2024-04-05 NOTE — PROGRESS NOTES
BMI 36.0-36.9,adult        6. 30 weeks gestation of pregnancy         and her options. She was also counseled on her preventative health maintenance recommendations and follow-up.

## 2024-04-18 ENCOUNTER — ROUTINE PRENATAL (OUTPATIENT)
Dept: OBGYN CLINIC | Age: 24
End: 2024-04-18
Payer: COMMERCIAL

## 2024-04-18 VITALS
BODY MASS INDEX: 38.09 KG/M2 | SYSTOLIC BLOOD PRESSURE: 124 MMHG | WEIGHT: 215 LBS | HEART RATE: 92 BPM | DIASTOLIC BLOOD PRESSURE: 84 MMHG

## 2024-04-18 DIAGNOSIS — O09.93 HIGH-RISK PREGNANCY IN THIRD TRIMESTER: Primary | ICD-10-CM

## 2024-04-18 DIAGNOSIS — O26.899 RH NEGATIVE STATE IN ANTEPARTUM PERIOD: ICD-10-CM

## 2024-04-18 DIAGNOSIS — Z67.91 RH NEGATIVE STATE IN ANTEPARTUM PERIOD: ICD-10-CM

## 2024-04-18 DIAGNOSIS — Z3A.32 32 WEEKS GESTATION OF PREGNANCY: ICD-10-CM

## 2024-04-18 DIAGNOSIS — F60.3 BORDERLINE PERSONALITY DISORDER (HCC): ICD-10-CM

## 2024-04-18 DIAGNOSIS — I10 CHRONIC HYPERTENSION: ICD-10-CM

## 2024-04-18 PROCEDURE — 4004F PT TOBACCO SCREEN RCVD TLK: CPT | Performed by: OBSTETRICS & GYNECOLOGY

## 2024-04-18 PROCEDURE — 3074F SYST BP LT 130 MM HG: CPT | Performed by: OBSTETRICS & GYNECOLOGY

## 2024-04-18 PROCEDURE — G8417 CALC BMI ABV UP PARAM F/U: HCPCS | Performed by: OBSTETRICS & GYNECOLOGY

## 2024-04-18 PROCEDURE — 59025 FETAL NON-STRESS TEST: CPT | Performed by: OBSTETRICS & GYNECOLOGY

## 2024-04-18 PROCEDURE — 99214 OFFICE O/P EST MOD 30 MIN: CPT | Performed by: OBSTETRICS & GYNECOLOGY

## 2024-04-18 PROCEDURE — 3079F DIAST BP 80-89 MM HG: CPT | Performed by: OBSTETRICS & GYNECOLOGY

## 2024-04-18 PROCEDURE — G8427 DOCREV CUR MEDS BY ELIG CLIN: HCPCS | Performed by: OBSTETRICS & GYNECOLOGY

## 2024-04-18 NOTE — PROGRESS NOTES
Carmen Amor is a 24 y.o. female 32w4d        OB History    Para Term  AB Living   2       1 0   SAB IAB Ectopic Molar Multiple Live Births   1                # Outcome Date GA Lbr Guille/2nd Weight Sex Delivery Anes PTL Lv   2 Current            1 2019               Vitals  BP: 124/84  Weight - Scale: 97.5 kg (215 lb)  Pulse: 92  Patient Position: Sitting      The patient was seen and evaluated. There was positive fetal movements. No contractions or leakage of fluid. Signs and symptoms of  labor as well as labor were reviewed. The S/S of Pre-Eclampsia were reviewed with the patient in detail. She is to report any of these if they occur. She currently denies any of these.    The patient had her 28 week labs ordered. Elevated 1 hour GTT 3 hour GTT ordered  No visits with results within 5 Week(s) from this visit.   Latest known visit with results is:   Hospital Outpatient Visit on 2024   Component Date Value Ref Range Status    Send Out Report 2024 FORWARD UNITY KIT FED   2892   Final    Total Protein, Urine 2024 <4  mg/dL Final    No normal range established.    Creatinine, Ur 2024 40.1  28.0 - 217.0 mg/dL Final    Urine Total Protein Creatinine Rat* 2024 Can not be calculated   Final   ]    3/13/2024 Advise fetal  surveillance from 32 weeks gestation with non-stress test at least weekly. Can be done in primary OB office.     2023 Daily baby ASA 81mg PO for prevention of preeclampsia in pregnant women at high risk recommended by USPSTF/ACOG.   2023 Advise fetal  surveillance from 32 weeks gestation with non-stress test at least weekly. Can be done in primary OB office.   24  PRAF done  ZANE by [facility]: 6/10/24    High Risk Dx:    Chronic hypertension  - Primary     Rh negative state in antepartum period     S/P bariatric surgery         PRENATAL LAB RESULTS:   Pre-pregnancy: BMI  Blood Type/Rh: A NEGATIVE   Antibody

## 2024-04-25 ENCOUNTER — ROUTINE PRENATAL (OUTPATIENT)
Dept: OBGYN CLINIC | Age: 24
End: 2024-04-25

## 2024-04-25 VITALS
BODY MASS INDEX: 38.09 KG/M2 | HEART RATE: 124 BPM | DIASTOLIC BLOOD PRESSURE: 91 MMHG | WEIGHT: 215 LBS | SYSTOLIC BLOOD PRESSURE: 146 MMHG

## 2024-04-25 DIAGNOSIS — Z67.91 RH NEGATIVE STATE IN ANTEPARTUM PERIOD: ICD-10-CM

## 2024-04-25 DIAGNOSIS — O09.93 HIGH-RISK PREGNANCY IN THIRD TRIMESTER: Primary | ICD-10-CM

## 2024-04-25 DIAGNOSIS — I10 CHRONIC HYPERTENSION: ICD-10-CM

## 2024-04-25 DIAGNOSIS — Z3A.33 33 WEEKS GESTATION OF PREGNANCY: ICD-10-CM

## 2024-04-25 DIAGNOSIS — F60.3 BORDERLINE PERSONALITY DISORDER (HCC): ICD-10-CM

## 2024-04-25 DIAGNOSIS — O26.899 RH NEGATIVE STATE IN ANTEPARTUM PERIOD: ICD-10-CM

## 2024-04-25 NOTE — PROGRESS NOTES
REACTIVE NST  CATEGORY 1 TRACING  Moderate Variability  Baseline of 140 bpm  SEE SCANNED DOCUMENT  RTO 2-5 DAYS FOR A FOLLOW UP APPOINTMENT WITH  TESTING.

## 2024-05-06 ENCOUNTER — ROUTINE PRENATAL (OUTPATIENT)
Dept: OBGYN CLINIC | Age: 24
End: 2024-05-06
Payer: COMMERCIAL

## 2024-05-06 DIAGNOSIS — I10 CHRONIC HYPERTENSION: ICD-10-CM

## 2024-05-06 DIAGNOSIS — O09.93 HIGH-RISK PREGNANCY IN THIRD TRIMESTER: Primary | ICD-10-CM

## 2024-05-06 DIAGNOSIS — Z67.91 RH NEGATIVE STATE IN ANTEPARTUM PERIOD: ICD-10-CM

## 2024-05-06 DIAGNOSIS — Z3A.35 35 WEEKS GESTATION OF PREGNANCY: ICD-10-CM

## 2024-05-06 DIAGNOSIS — F60.3 BORDERLINE PERSONALITY DISORDER (HCC): ICD-10-CM

## 2024-05-06 DIAGNOSIS — O26.899 RH NEGATIVE STATE IN ANTEPARTUM PERIOD: ICD-10-CM

## 2024-05-06 PROCEDURE — 59025 FETAL NON-STRESS TEST: CPT | Performed by: NURSE PRACTITIONER

## 2024-05-06 PROCEDURE — G8427 DOCREV CUR MEDS BY ELIG CLIN: HCPCS | Performed by: NURSE PRACTITIONER

## 2024-05-06 PROCEDURE — 99213 OFFICE O/P EST LOW 20 MIN: CPT | Performed by: NURSE PRACTITIONER

## 2024-05-06 PROCEDURE — G8417 CALC BMI ABV UP PARAM F/U: HCPCS | Performed by: NURSE PRACTITIONER

## 2024-05-06 PROCEDURE — 4004F PT TOBACCO SCREEN RCVD TLK: CPT | Performed by: NURSE PRACTITIONER

## 2024-05-06 NOTE — PROGRESS NOTES
Carmen Amor is a 24 y.o. female 35w1d        OB History    Para Term  AB Living   2       1 0   SAB IAB Ectopic Molar Multiple Live Births   1                # Outcome Date GA Lbr Guille/2nd Weight Sex Delivery Anes PTL Lv   2 Current            2019               Vitals         The patient was seen and evaluated. There was positive fetal movements. No contractions or leakage of fluid. Signs and symptoms of  labor as well as labor were reviewed. The S/S of Pre-Eclampsia were reviewed with the patient in detail. She is to report any of these if they occur. She currently denies any of these.    The patient had her 28 week labs completed.  No visits with results within 5 Week(s) from this visit.   Latest known visit with results is:   Hospital Outpatient Visit on 2024   Component Date Value Ref Range Status    Send Out Report 2024 FORWARD UNITY KIT FED   8435   Final    Total Protein, Urine 2024 <4  mg/dL Final    No normal range established.    Creatinine, Ur 2024 40.1  28.0 - 217.0 mg/dL Final    Urine Total Protein Creatinine Rat* 2024 Can not be calculated   Final   ]    3/13/2024 Advise fetal  surveillance from 32 weeks gestation with non-stress test at least weekly. Can be done in primary OB office.     2023 Daily baby ASA 81mg PO for prevention of preeclampsia in pregnant women at high risk recommended by USPSTF/ACOG.   2023 Advise fetal  surveillance from 32 weeks gestation with non-stress test at least weekly. Can be done in primary OB office.   24  PRAF done  ZANE by [facility]: 6/10/24    High Risk Dx:    Chronic hypertension  - Primary     Rh negative state in antepartum period     S/P bariatric surgery         PRENATAL LAB RESULTS:   Pre-pregnancy: BMI  Blood Type/Rh: A NEGATIVE   Antibody Screen: NEGATIVE  Hemoglobin, Hematocrit: 13.6/40.2  Rubella: IMMUNE  T. Pallidum, IgG: NR  Hepatitis B Surface

## 2024-05-14 ENCOUNTER — ROUTINE PRENATAL (OUTPATIENT)
Dept: OBGYN CLINIC | Age: 24
End: 2024-05-14
Payer: COMMERCIAL

## 2024-05-14 ENCOUNTER — HOSPITAL ENCOUNTER (OUTPATIENT)
Age: 24
Discharge: HOME | End: 2024-05-15
Attending: OBSTETRICS & GYNECOLOGY | Admitting: OBSTETRICS & GYNECOLOGY
Payer: COMMERCIAL

## 2024-05-14 ENCOUNTER — HOSPITAL ENCOUNTER (OUTPATIENT)
Age: 24
Setting detail: SPECIMEN
Discharge: HOME | End: 2024-05-14
Payer: COMMERCIAL

## 2024-05-14 VITALS
DIASTOLIC BLOOD PRESSURE: 95 MMHG | SYSTOLIC BLOOD PRESSURE: 151 MMHG | BODY MASS INDEX: 39.86 KG/M2 | HEART RATE: 98 BPM | WEIGHT: 225 LBS

## 2024-05-14 DIAGNOSIS — O09.93 HIGH-RISK PREGNANCY IN THIRD TRIMESTER: ICD-10-CM

## 2024-05-14 DIAGNOSIS — F60.3 BORDERLINE PERSONALITY DISORDER (HCC): ICD-10-CM

## 2024-05-14 DIAGNOSIS — I10 CHRONIC HYPERTENSION: ICD-10-CM

## 2024-05-14 DIAGNOSIS — Z67.91 RH NEGATIVE STATE IN ANTEPARTUM PERIOD: ICD-10-CM

## 2024-05-14 DIAGNOSIS — Z3A.36 36 WEEKS GESTATION OF PREGNANCY: ICD-10-CM

## 2024-05-14 DIAGNOSIS — O26.899 RH NEGATIVE STATE IN ANTEPARTUM PERIOD: ICD-10-CM

## 2024-05-14 DIAGNOSIS — O09.93 HIGH-RISK PREGNANCY IN THIRD TRIMESTER: Primary | ICD-10-CM

## 2024-05-14 PROBLEM — B96.89 BACTERIAL VAGINOSIS: Status: RESOLVED | Noted: 2022-08-23 | Resolved: 2024-05-14

## 2024-05-14 PROBLEM — F43.10 POST-TRAUMATIC STRESS DISORDER, UNSPECIFIED: Status: RESOLVED | Noted: 2023-06-27 | Resolved: 2024-05-14

## 2024-05-14 PROBLEM — F33.3 MDD (MAJOR DEPRESSIVE DISORDER), RECURRENT, SEVERE, WITH PSYCHOSIS (HCC): Status: RESOLVED | Noted: 2020-07-20 | Resolved: 2024-05-14

## 2024-05-14 PROBLEM — F33.9 MAJOR DEPRESSIVE DISORDER, RECURRENT (HCC): Status: RESOLVED | Noted: 2020-07-20 | Resolved: 2024-05-14

## 2024-05-14 PROBLEM — Z34.90 SECOND PREGNANCY: Status: RESOLVED | Noted: 2023-11-15 | Resolved: 2024-05-14

## 2024-05-14 PROBLEM — N89.8 VAGINAL CYST: Status: RESOLVED | Noted: 2022-08-23 | Resolved: 2024-05-14

## 2024-05-14 PROBLEM — N76.0 BACTERIAL VAGINOSIS: Status: RESOLVED | Noted: 2022-08-23 | Resolved: 2024-05-14

## 2024-05-14 LAB
ABO + RH BLD: NORMAL
ALBUMIN SERPL-MCNC: 3.3 G/DL (ref 3.5–5.2)
ALBUMIN/GLOB SERPL: 1 {RATIO} (ref 1–2.5)
ALP SERPL-CCNC: 189 U/L (ref 35–104)
ALT SERPL-CCNC: 22 U/L (ref 10–35)
ANION GAP SERPL CALCULATED.3IONS-SCNC: 13 MMOL/L (ref 9–16)
ARM BAND NUMBER: NORMAL
AST SERPL-CCNC: 21 U/L (ref 10–35)
BILIRUB SERPL-MCNC: 0.2 MG/DL (ref 0–1.2)
BLOOD BANK SAMPLE EXPIRATION: NORMAL
BLOOD GROUP ANTIBODIES SERPL: NEGATIVE
BUN SERPL-MCNC: 7 MG/DL (ref 6–20)
CALCIUM SERPL-MCNC: 8.9 MG/DL (ref 8.6–10.4)
CHLORIDE SERPL-SCNC: 105 MMOL/L (ref 98–107)
CO2 SERPL-SCNC: 20 MMOL/L (ref 20–31)
CREAT SERPL-MCNC: 0.5 MG/DL (ref 0.5–0.9)
CREAT UR-MCNC: 135 MG/DL (ref 28–217)
ERYTHROCYTE [DISTWIDTH] IN BLOOD BY AUTOMATED COUNT: 13.7 % (ref 11.8–14.4)
GFR, ESTIMATED: >90 ML/MIN/1.73M2
GLUCOSE SERPL-MCNC: 102 MG/DL (ref 74–99)
HCT VFR BLD AUTO: 34.8 % (ref 36.3–47.1)
HGB BLD-MCNC: 11.6 G/DL (ref 11.9–15.1)
MCH RBC QN AUTO: 30.5 PG (ref 25.2–33.5)
MCHC RBC AUTO-ENTMCNC: 33.3 G/DL (ref 28.4–34.8)
MCV RBC AUTO: 91.6 FL (ref 82.6–102.9)
NRBC BLD-RTO: 0 PER 100 WBC
PLATELET # BLD AUTO: 187 K/UL (ref 138–453)
PMV BLD AUTO: 9.6 FL (ref 8.1–13.5)
POTASSIUM SERPL-SCNC: 3.4 MMOL/L (ref 3.7–5.3)
PROT SERPL-MCNC: 6.4 G/DL (ref 6.6–8.7)
RBC # BLD AUTO: 3.8 M/UL (ref 3.95–5.11)
SODIUM SERPL-SCNC: 138 MMOL/L (ref 136–145)
T PALLIDUM AB SER QL IA: NONREACTIVE
TOTAL PROTEIN, URINE: 25 MG/DL
URINE TOTAL PROTEIN CREATININE RATIO: 0.18
WBC OTHER # BLD: 11.1 K/UL (ref 3.5–11.3)

## 2024-05-14 PROCEDURE — 3080F DIAST BP >= 90 MM HG: CPT | Performed by: NURSE PRACTITIONER

## 2024-05-14 PROCEDURE — 84156 ASSAY OF PROTEIN URINE: CPT

## 2024-05-14 PROCEDURE — 6370000000 HC RX 637 (ALT 250 FOR IP): Performed by: STUDENT IN AN ORGANIZED HEALTH CARE EDUCATION/TRAINING PROGRAM

## 2024-05-14 PROCEDURE — G8417 CALC BMI ABV UP PARAM F/U: HCPCS | Performed by: NURSE PRACTITIONER

## 2024-05-14 PROCEDURE — 86780 TREPONEMA PALLIDUM: CPT

## 2024-05-14 PROCEDURE — 99213 OFFICE O/P EST LOW 20 MIN: CPT | Performed by: NURSE PRACTITIONER

## 2024-05-14 PROCEDURE — 86900 BLOOD TYPING SEROLOGIC ABO: CPT

## 2024-05-14 PROCEDURE — G8427 DOCREV CUR MEDS BY ELIG CLIN: HCPCS | Performed by: NURSE PRACTITIONER

## 2024-05-14 PROCEDURE — 3077F SYST BP >= 140 MM HG: CPT | Performed by: NURSE PRACTITIONER

## 2024-05-14 PROCEDURE — 85027 COMPLETE CBC AUTOMATED: CPT

## 2024-05-14 PROCEDURE — 4004F PT TOBACCO SCREEN RCVD TLK: CPT | Performed by: NURSE PRACTITIONER

## 2024-05-14 PROCEDURE — 86850 RBC ANTIBODY SCREEN: CPT

## 2024-05-14 PROCEDURE — 82570 ASSAY OF URINE CREATININE: CPT

## 2024-05-14 PROCEDURE — 80053 COMPREHEN METABOLIC PANEL: CPT

## 2024-05-14 PROCEDURE — 59025 FETAL NON-STRESS TEST: CPT | Performed by: NURSE PRACTITIONER

## 2024-05-14 PROCEDURE — 86901 BLOOD TYPING SEROLOGIC RH(D): CPT

## 2024-05-14 PROCEDURE — 6370000000 HC RX 637 (ALT 250 FOR IP)

## 2024-05-14 RX ORDER — LABETALOL 200 MG/1
400 TABLET, FILM COATED ORAL EVERY 8 HOURS SCHEDULED
Status: DISCONTINUED | OUTPATIENT
Start: 2024-05-15 | End: 2024-05-15 | Stop reason: HOSPADM

## 2024-05-14 RX ORDER — NIFEDIPINE 30 MG/1
30 TABLET, EXTENDED RELEASE ORAL ONCE
Status: COMPLETED | OUTPATIENT
Start: 2024-05-14 | End: 2024-05-14

## 2024-05-14 RX ORDER — NIFEDIPINE 30 MG/1
120 TABLET, EXTENDED RELEASE ORAL DAILY
Status: DISCONTINUED | OUTPATIENT
Start: 2024-05-15 | End: 2024-05-15 | Stop reason: HOSPADM

## 2024-05-14 RX ORDER — ONDANSETRON 4 MG/1
4 TABLET, ORALLY DISINTEGRATING ORAL EVERY 8 HOURS PRN
Status: DISCONTINUED | OUTPATIENT
Start: 2024-05-14 | End: 2024-05-15 | Stop reason: HOSPADM

## 2024-05-14 RX ORDER — ASPIRIN 81 MG/1
81 TABLET, CHEWABLE ORAL DAILY
Status: DISCONTINUED | OUTPATIENT
Start: 2024-05-14 | End: 2024-05-15 | Stop reason: HOSPADM

## 2024-05-14 RX ORDER — NIFEDIPINE 30 MG/1
30 TABLET, EXTENDED RELEASE ORAL DAILY
Status: DISCONTINUED | OUTPATIENT
Start: 2024-05-14 | End: 2024-05-14

## 2024-05-14 RX ORDER — LABETALOL 200 MG/1
400 TABLET, FILM COATED ORAL 3 TIMES DAILY
Status: DISCONTINUED | OUTPATIENT
Start: 2024-05-14 | End: 2024-05-14

## 2024-05-14 RX ORDER — ACETAMINOPHEN 500 MG
1000 TABLET ORAL EVERY 8 HOURS SCHEDULED
Status: DISCONTINUED | OUTPATIENT
Start: 2024-05-14 | End: 2024-05-15 | Stop reason: HOSPADM

## 2024-05-14 RX ORDER — NIFEDIPINE 30 MG/1
90 TABLET, EXTENDED RELEASE ORAL DAILY
Status: DISCONTINUED | OUTPATIENT
Start: 2024-05-15 | End: 2024-05-14

## 2024-05-14 RX ORDER — ONDANSETRON 2 MG/ML
4 INJECTION INTRAMUSCULAR; INTRAVENOUS EVERY 6 HOURS PRN
Status: DISCONTINUED | OUTPATIENT
Start: 2024-05-14 | End: 2024-05-15 | Stop reason: HOSPADM

## 2024-05-14 RX ORDER — NIFEDIPINE 30 MG/1
60 TABLET, EXTENDED RELEASE ORAL ONCE
Status: COMPLETED | OUTPATIENT
Start: 2024-05-14 | End: 2024-05-14

## 2024-05-14 RX ORDER — VITAMIN A, ASCORBIC ACID, CHOLECALCIFEROL, .ALPHA.-TOCOPHEROL ACETATE, DL-, THIAMINE MONONITRATE, RIBOFLAVIN, NIACINAMIDE, PYRIDOXINE HYDROCHLORIDE, FOLIC ACID, CYANOCOBALAMIN, CALCIUM CARBONATE, IRON, ZINC OXIDE, AND CUPRIC OXIDE 4000; 120; 400; 22; 1.84; 3; 20; 10; 1; 12; 200; 29; 25; 2 [IU]/1; MG/1; [IU]/1; [IU]/1; MG/1; MG/1; MG/1; MG/1; MG/1; UG/1; MG/1; MG/1; MG/1; MG/1
1 TABLET ORAL DAILY
Status: DISCONTINUED | OUTPATIENT
Start: 2024-05-14 | End: 2024-05-15 | Stop reason: HOSPADM

## 2024-05-14 RX ADMIN — NIFEDIPINE 60 MG: 30 TABLET, FILM COATED, EXTENDED RELEASE ORAL at 14:21

## 2024-05-14 RX ADMIN — Medication 1 TABLET: at 15:28

## 2024-05-14 RX ADMIN — NIFEDIPINE 30 MG: 30 TABLET, FILM COATED, EXTENDED RELEASE ORAL at 15:28

## 2024-05-14 RX ADMIN — LABETALOL HYDROCHLORIDE 400 MG: 200 TABLET, FILM COATED ORAL at 23:59

## 2024-05-14 RX ADMIN — LABETALOL HYDROCHLORIDE 400 MG: 200 TABLET, FILM COATED ORAL at 17:16

## 2024-05-14 RX ADMIN — ASPIRIN 81 MG 81 MG: 81 TABLET ORAL at 15:28

## 2024-05-14 RX ADMIN — NIFEDIPINE 30 MG: 30 TABLET, FILM COATED, EXTENDED RELEASE ORAL at 12:18

## 2024-05-14 NOTE — H&P
OBSTETRICAL HISTORY AND PHYSICAL  [facility]    Date: 2024       Time: 12:28 PM   Patient Name: Carmen Amor     Patient : 2000  Room/Bed: [place] [address]    Admission Date/Time: 2024 11:13 AM      CC: Elevated Blood Pressure      HPI: Carmen Amor is a 24 y.o.  at 36w2d who presents from prenatal clinic for elevated blood pressure. The patient reports fetal movement is present, denies contractions, denies loss of fluid, denies vaginal bleeding. Patient denies headache, vision changes, nausea, vomiting, fever, chills, shortness of breath, chest pain, RUQ pain, abdominal pain, diarrhea, change in color/amount/odor of vaginal discharge, dysuria or, hematuria. Patient states she went to her scheduled prenatal visit and had recorded BP of 151/95 and advised to be evaluated in [facility]. Patient herself denies any acute symptoms include sx of PreE. States she did have history of HTN prior to pregnancy but states did not take medications. Denies any RUQ abdominal pain, SOB or Chest pain.       DATING:  LMP: No LMP recorded. Patient is pregnant.  Estimated Date of Delivery: 24   Based on: early ultrasound, at 7 2/7 weeks GA    PREGNANCY RISK FACTORS:  Patient Active Problem List   Diagnosis    Major depressive disorder, recurrent (HCC)    MDD (major depressive disorder), recurrent, severe, with psychosis (HCC)    Major depressive disorder, recurrent, severe with psychotic features (HCC)    Complex posttraumatic stress disorder    Vaginal cyst    Bacterial vaginosis    Rh negative state in antepartum period    Second pregnancy    cHTN (no meds)    Bipolar I disorder, severe, current or most recent episode depressed, with psychotic features (HCC)    Borderline personality disorder (HCC)    Post-traumatic stress disorder, unspecified    BMI 36.0-36.9,adult    36 weeks gestation of pregnancy        Steroids Given In This Pregnancy:  no     REVIEW OF SYSTEMS:

## 2024-05-14 NOTE — PROGRESS NOTES
REACTIVE NST  CATEGORY 1 TRACING  Moderate Variability  Baseline of 150 bpm  SEE SCANNED DOCUMENT  RTO 2-5 DAYS FOR A FOLLOW UP APPOINTMENT WITH  TESTING.   NST for elevated BMI, chronic hypertension.  NST for 33 minutes.          Carmen Amor is a 24 y.o. female 36w2d        OB History    Para Term  AB Living   2       1 0   SAB IAB Ectopic Molar Multiple Live Births   1                # Outcome Date GA Lbr Guille/2nd Weight Sex Delivery Anes PTL Lv   2 Current            1 2019                 Vitals  BP: (!) 151/95  Weight - Scale: 102.1 kg (225 lb)  Pulse: 98  Patient Position: Sitting  Albumin: Negative  Glucose: Negative      The patient was seen and evaluated. There was positive fetal movements. No contractions or leakage of fluid. Signs and symptoms of labor were reviewed.  The S/S of Pre-Eclampsia were reviewed with the patient in detail. She is to report any of these if they occur. She currently denies any of these. Elevated blood pressure in office today. Denies headache, blurred vision, epigastric pain.  + fetal movement.    The patient was instructed on fetal kick counts and was given a kick sheet to complete every 8 hours. She was instructed that the baby should move at a minimum of ten times within one hour after a meal. The patient was instructed to lay down on her left side twenty minutes after eating and count movements for up to one hour with a target value of ten movements.  She was instructed to notify the office if she did not make that target after two attempts or if after any attempt there was less than four movements.    The patient reports that the targets have been made Yes.    3/13/2024 Advise fetal  surveillance from 32 weeks gestation with non-stress test at least weekly. Can be done in primary OB office.     2023 Daily baby ASA 81mg PO for prevention of preeclampsia in pregnant women at high risk recommended by USPSTF/ACOG.   2023 Advise

## 2024-05-14 NOTE — DISCHARGE SUMMARY
Obstetric Discharge Summary  [facility]    Patient Name: Carmen Amro  Patient : 2000  Primary Care Physician: Samantha Orellana MD  Admit Date: 2024    Principal Diagnosis: IUP at 36w2d, admitted for Blood pressure management      Her pregnancy has been complicated by:   Patient Active Problem List   Diagnosis    Major depressive disorder, recurrent, severe with psychotic features (HCC)    Complex posttraumatic stress disorder    Rh negative state in antepartum period    cHTN (no meds)    Bipolar I    Borderline personality disorder    36 weeks gestation of pregnancy       Pertinent Findings & Procedures:   Carmen Amor is a 24 y.o. female  at 36w2d admitted for blood pressure management. She was started on procardia 120 mg XL, labetalol 400 mg TID. Pre E labs were wnl, P/C 0.18. Repeat PreE labs on HD#2 were wnl         Course of patient: patient was discharged with an increase in antihypertensive medications    Discharge to: Home    Readmission planned: yes for delivery      Indication for 6 week PP 2 hour GTT?: no     Eligible for 2 week PP virtual visit? no - private pateint      Medications:      Medication List        START taking these medications      labetalol 200 MG tablet  Commonly known as: NORMODYNE  Take 2 tablets by mouth every 8 hours     NIFEdipine 30 MG extended release tablet  Commonly known as: PROCARDIA XL  Take 4 tablets by mouth daily     ondansetron 4 MG disintegrating tablet  Commonly known as: ZOFRAN-ODT  Take 1 tablet by mouth every 8 hours as needed for Nausea or Vomiting            CONTINUE taking these medications      Prenatal Vitamins 28-0.8 MG Tabs  Take 1 tablet by mouth daily            STOP taking these medications      CALCIUM 500 PO               Where to Get Your Medications        These medications were sent to [facility] - [address] [place], [place] - [address] - P [phone] - F [phone]  [address]

## 2024-05-15 VITALS
OXYGEN SATURATION: 97 % | DIASTOLIC BLOOD PRESSURE: 77 MMHG | SYSTOLIC BLOOD PRESSURE: 144 MMHG | RESPIRATION RATE: 18 BRPM | HEART RATE: 90 BPM | TEMPERATURE: 98.5 F

## 2024-05-15 LAB
ALBUMIN SERPL-MCNC: 3.5 G/DL (ref 3.5–5.2)
ALBUMIN/GLOB SERPL: 1 {RATIO} (ref 1–2.5)
ALP SERPL-CCNC: 165 U/L (ref 35–104)
ALT SERPL-CCNC: 22 U/L (ref 10–35)
ANION GAP SERPL CALCULATED.3IONS-SCNC: 13 MMOL/L (ref 9–16)
AST SERPL-CCNC: 23 U/L (ref 10–35)
BASOPHILS # BLD: 0.04 K/UL (ref 0–0.2)
BASOPHILS NFR BLD: 0 % (ref 0–2)
BILIRUB SERPL-MCNC: 0.3 MG/DL (ref 0–1.2)
BUN SERPL-MCNC: 7 MG/DL (ref 6–20)
CALCIUM SERPL-MCNC: 9 MG/DL (ref 8.6–10.4)
CHLORIDE SERPL-SCNC: 106 MMOL/L (ref 98–107)
CO2 SERPL-SCNC: 19 MMOL/L (ref 20–31)
CREAT SERPL-MCNC: 0.5 MG/DL (ref 0.5–0.9)
EOSINOPHIL # BLD: 0.1 K/UL (ref 0–0.44)
EOSINOPHILS RELATIVE PERCENT: 1 % (ref 1–4)
ERYTHROCYTE [DISTWIDTH] IN BLOOD BY AUTOMATED COUNT: 13.9 % (ref 11.8–14.4)
GFR, ESTIMATED: >90 ML/MIN/1.73M2
GLUCOSE SERPL-MCNC: 94 MG/DL (ref 74–99)
HCT VFR BLD AUTO: 34.2 % (ref 36.3–47.1)
HGB BLD-MCNC: 11.4 G/DL (ref 11.9–15.1)
IMM GRANULOCYTES # BLD AUTO: 0.09 K/UL (ref 0–0.3)
IMM GRANULOCYTES NFR BLD: 1 %
LYMPHOCYTES NFR BLD: 2.69 K/UL (ref 1.1–3.7)
LYMPHOCYTES RELATIVE PERCENT: 24 % (ref 24–43)
MCH RBC QN AUTO: 29.6 PG (ref 25.2–33.5)
MCHC RBC AUTO-ENTMCNC: 33.3 G/DL (ref 28.4–34.8)
MCV RBC AUTO: 88.8 FL (ref 82.6–102.9)
MONOCYTES NFR BLD: 0.56 K/UL (ref 0.1–1.2)
MONOCYTES NFR BLD: 5 % (ref 3–12)
NEUTROPHILS NFR BLD: 69 % (ref 36–65)
NEUTS SEG NFR BLD: 7.58 K/UL (ref 1.5–8.1)
NRBC BLD-RTO: 0 PER 100 WBC
PLATELET # BLD AUTO: 170 K/UL (ref 138–453)
PMV BLD AUTO: 9.6 FL (ref 8.1–13.5)
POTASSIUM SERPL-SCNC: 3.2 MMOL/L (ref 3.7–5.3)
PROT SERPL-MCNC: 6.2 G/DL (ref 6.6–8.7)
RBC # BLD AUTO: 3.85 M/UL (ref 3.95–5.11)
SODIUM SERPL-SCNC: 138 MMOL/L (ref 136–145)
WBC OTHER # BLD: 11.1 K/UL (ref 3.5–11.3)

## 2024-05-15 PROCEDURE — 6370000000 HC RX 637 (ALT 250 FOR IP): Performed by: STUDENT IN AN ORGANIZED HEALTH CARE EDUCATION/TRAINING PROGRAM

## 2024-05-15 PROCEDURE — 85025 COMPLETE CBC W/AUTO DIFF WBC: CPT

## 2024-05-15 PROCEDURE — 80053 COMPREHEN METABOLIC PANEL: CPT

## 2024-05-15 PROCEDURE — 36415 COLL VENOUS BLD VENIPUNCTURE: CPT

## 2024-05-15 PROCEDURE — 99214 OFFICE O/P EST MOD 30 MIN: CPT | Performed by: OBSTETRICS & GYNECOLOGY

## 2024-05-15 RX ORDER — LABETALOL 200 MG/1
400 TABLET, FILM COATED ORAL EVERY 8 HOURS SCHEDULED
Qty: 60 TABLET | Refills: 3 | Status: SHIPPED | OUTPATIENT
Start: 2024-05-15 | End: 2024-05-24 | Stop reason: HOSPADM

## 2024-05-15 RX ORDER — ONDANSETRON 4 MG/1
4 TABLET, ORALLY DISINTEGRATING ORAL EVERY 8 HOURS PRN
Qty: 10 TABLET | Refills: 0 | Status: SHIPPED | OUTPATIENT
Start: 2024-05-15

## 2024-05-15 RX ORDER — NIFEDIPINE 30 MG/1
120 TABLET, EXTENDED RELEASE ORAL DAILY
Qty: 30 TABLET | Refills: 3 | Status: SHIPPED | OUTPATIENT
Start: 2024-05-16 | End: 2024-05-24 | Stop reason: HOSPADM

## 2024-05-15 RX ADMIN — NIFEDIPINE 120 MG: 30 TABLET, FILM COATED, EXTENDED RELEASE ORAL at 09:05

## 2024-05-15 RX ADMIN — LABETALOL HYDROCHLORIDE 400 MG: 200 TABLET, FILM COATED ORAL at 08:03

## 2024-05-15 RX ADMIN — Medication 1 TABLET: at 09:05

## 2024-05-15 RX ADMIN — ASPIRIN 81 MG 81 MG: 81 TABLET ORAL at 09:04

## 2024-05-15 NOTE — PROGRESS NOTES
0.90 mg/dL    Est, Glom Filt Rate >90 >60 mL/min/1.73m2    Calcium 9.0 8.6 - 10.4 mg/dL    Total Protein 6.2 (L) 6.6 - 8.7 g/dL    Albumin 3.5 3.5 - 5.2 g/dL    Albumin/Globulin Ratio 1.0 1.0 - 2.5    Total Bilirubin 0.3 0.00 - 1.20 mg/dL    Alkaline Phosphatase 165 (H) 35 - 104 U/L    ALT 22 10 - 35 U/L    AST 23 10 - 35 U/L     23yo  @ 36w3d  cHTN previously not on medication, medications started and BP controlled    - IOL scheduled for     -  Counseled on s/s of preeclampsia.  She denies at this time    - Labs reviewed in detail      At this time there is no indication for delivery and recommendation is for discharge home with BP medication and close follow up with her OB office.  All questions answered and strict labor and preeclampsia precautions provided.    Mike Hodge, DO

## 2024-05-15 NOTE — FLOWSHEET NOTE
Patient d/c home via ambulatory.  Instructed to return Sunday at 2200 for her scheduled medical induction, patient will call one hour prior.  Patient verbalized understanding of medication due times and to check her BP daily at home.  Will return if any s/s of labor occur as well.  Patients' mother and father at bedside and also verbalize understanding, all questions addressed and answered.   
Problem: Mobility Impaired (Adult and Pediatric) Goal: *Acute Goals and Plan of Care (Insert Text) Outcome: Progressing Towards Goal 
Note: LTG: (Reviewed and updated 6/15/20) (1.)Mr. Sharad Amador will move from supine to sit and sit to supine , scoot up and down and roll side to side with INDEPENDENT within 7 treatment day(s). (2.)Mr. Sharad Amador will transfer from bed to chair and chair to bed with SUPERVISION using the least restrictive device within 7 treatment day(s). (3.)Mr. Sharad Amador will ambulate with STAND BY ASSIST for 100+ feet with the least restrictive device within 7 treatment day(s). (4.)Mr. Sharad Amador will perform exercises per HEP independently to improve strength and mobility within 7 days. (5.)Mr. Sharad Amador will perform standing mobility and balance activities for 10+ minutes to improve strength and mobility within 7 days. ________________________________________________________________________________________________ PHYSICAL THERAPY: Daily Note and PM 6/18/2020 INPATIENT: PT Visit Days : 3 Payor: Teodoro Bertrand / Plan: [address] [place] [address] HMO / Product Type: [facility] Care Medicare /   
  
NAME/AGE/GENDER: Jeremie More is a 79 y.o. male PRIMARY DIAGNOSIS: Acute metabolic encephalopathy [U96.29] Acute respiratory failure with hypoxia (HCC) Acute respiratory failure with hypoxia (HCC) Procedure(s) (LRB): 
THORACENTESIS (N/A) ULTRASOUND (Bilateral) 9 Days Post-Op ICD-10: Treatment Diagnosis:  
 · Generalized Muscle Weakness (M62.81) · Other lack of cordination (R27.8) · Difficulty in walking, Not elsewhere classified (R26.2) · Other abnormalities of gait and mobility (R26.89) · Low Back Pain (M54.5) Precaution/Allergies: 
Patient has no known allergies. ASSESSMENT:  
 
Mr. Anola Pain  is a 79year old male who has been admitted to hospital on 05/24 with above diagnosis and hx of cancer.  Prior to hospital admission
pt lives with wife in a 1 story home with 0 step(s) to enter with no railing. Pt endorses 0 falls in past 6 months. Prior to admission No O2 usage at home, no assistance with ADLs and uses no DME for mobility. 6/18- pt presents in supine without complaints, agreeable to therapy treatment and mobility. Transfers to sitting with additional time, min cueing, and SBA. Intact seated balance. CGA-min A to stand from edge of bed. Ambulates total of 45 ft in room and hallway with CGA/min A and RW. Demonstrates slow pace and fatigues quickly, legs getting progressively weaker with increased knee flexion throughout. Up to chair afterwards, cueing for safe/slow descent into chair. Pt took short rest break, then stood with CGA from chair using rails. Ambulates 20 ft in room with same assist level. Up to chair again, O2 sats 90% on 3L. Performs below LE exercises with good participation. Left up in chair with needs in reach. When asked pt specifically, he feels he needs STR at discharge rather than home with [place] [facility]. This therapist agrees with rehab at [place], however per case management wife has been undecided about rehab. This section established at most recent assessment PROBLEM LIST (Impairments causing functional limitations): 1. Decreased Strength 2. Decreased ADL/Functional Activities 3. Decreased Transfer Abilities 4. Decreased Ambulation Ability/Technique 5. Decreased Balance 6. Increased Pain 7. Decreased Activity Tolerance 8. Increased Fatigue 9. Decreased Flexibility/Joint Mobility 10. Decreased [place] with Home Exercise Program 
 INTERVENTIONS PLANNED: (Benefits and precautions of physical therapy have been discussed with the patient.) 1. Balance Exercise 2. Bed Mobility 3. Family Education 4. Gait Training 5. Heat 6. Home Exercise Program (HEP) 7. Manual Therapy 8. Neuromuscular Re-education/Strengthening 9. Range of Motion (ROM) 10. Therapeutic Activites
11. Therapeutic Exercise/Strengthening 12. Transfer Training TREATMENT PLAN: Frequency/Duration: 3 times a week for duration of hospital stay Rehabilitation Potential For Stated Goals: Good REHAB RECOMMENDATIONS (at time of discharge pending progress):   
Placement: It is my opinion, based on this patient's performance to date, that Mr. Julio Fernandes may benefit from intensive therapy at an [address] [place] [address] after discharge due to a probable need for close medical supervision by a rehab physician, a probable need for 24 hour rehab nursing, a probable need for multiple therapy disciplines and potential to make ongoing and sustainable functional improvement that is of practical value. Sayra Shadow Equipment:  
? Walkers, Type: Rolling Nicholette Peeling HISTORY:  
History of Present Injury/Illness (Reason for Referral): 
Per Physician Note: 
 
Elisa Scheuermann is a 79 y.o. male with a past medical history of HEENT cancer undergoing chemo/radiation who presents to the [facility] ER with report of SOB and chest discomfort for the past several days. He also admits to mild cough but denies fevers or chills. Admits to feeling a little better and breathing a bit better since arrival. 
  
Past Medical History/Comorbidities:  
Mr. Julio Fernandes  has a past medical history of GERD (gastroesophageal reflux disease), Head and neck cancer ([facility] [address] (9/30/2015), History of squamous cell carcinoma, History of throat cancer (2015), Hypercholesteremia, Hypertension, Hypomagnesemia (5/20/2020), Rectal cancer ([facility] [address] (2018), Type 2 diabetes mellitus ([facility] [address] and Vomiting (2/23/2016). Mr. Julio Fernandes  has a past surgical history that includes hx orthopaedic (Right, 1966); hx other surgical (9/9/15); hx heent; hx tonsillectomy; hx heent (2015); hx colonoscopy (05/2018); hx vascular access; hx lymph node dissection; hx other surgical; and flexible sigmoidoscopy (N/A, 6/2/2020). Social History/Living Environment:
Home Environment: Private residence # Steps to Enter: 0 One/Two Story Residence: One story Living Alone: No 
Support Systems: Spouse/Significant Other/Partner Patient Expects to be Discharged to[de-identified] Private residence Current DME Used/Available at Home: None Tub or Shower Type: Tub/Shower combination Prior Level of Function/Work/Activity: Mod I mobility and amb Number of Personal Factors/Comorbidities that affect the Plan of Care: 3+: HIGH COMPLEXITY EXAMINATION:  
Most Recent Physical Functioning:  
Gross Assessment: 
AROM: Generally decreased, functional 
Strength: Generally decreased, functional 
Coordination: Within functional limits Posture: 
Posture (WDL): Exceptions to [facility] Posture Assessment: Forward head, Rounded shoulders, Trunk flexion Balance: 
Sitting: Intact Standing: Impaired Standing - Static: Fair Standing - Dynamic : Fair Bed Mobility: 
Rolling: Contact guard assistance;Stand-by assistance Supine to Sit: Contact Guard Assist;Additional time Scooting: Stand-by assistance Wheelchair Mobility: 
  
Transfers: 
Sit to Stand: Contact guard assistance;Minimum assistance Stand to Sit: Contact guard assistance Bed to Chair: Contact guard assistance Interventions: Verbal cues; Safety awareness training Duration: 18 Minutes Gait: 
  
Base of Support: Center of gravity altered Speed/Christina: Pace decreased (<100 feet/min); Slow Step Length: Left shortened;Right shortened Gait Abnormalities: Trunk sway increased Distance (ft): 45 Feet (ft) Assistive Device: Walker, rolling Ambulation - Level of Assistance: Contact guard assistance;Minimal assistance Interventions: Verbal cues; Safety awareness training; Tactile cues Body Structures Involved: 1. Lungs 2. Bones 3. Joints Body Functions Affected: 1. Sensory/Pain 2. Movement Related Activities and Participation Affected: 1. Mobility 2. Self Care 3. Interpersonal Interactions and Relationships
4. Community, Social and [place] Number of elements that affect the Plan of Care: 4+: HIGH COMPLEXITY CLINICAL PRESENTATION:  
Presentation: Stable and uncomplicated: LOW COMPLEXITY CLINICAL DECISION MAKIN [facility] [address] [address] AM-PAC 6 Clicks Basic Mobility Inpatient Short Form How much difficulty does the patient currently have. .. Unable A Lot A Little None 1. Turning over in bed (including adjusting bedclothes, sheets and blankets)? [] 1   [] 2   [] 3   [x] 4  
2. Sitting down on and standing up from a chair with arms ( e.g., wheelchair, bedside commode, etc.)   [] 1   [] 2   [x] 3   [] 4  
3. Moving from lying on back to sitting on the side of the bed? [] 1   [] 2   [] 3   [x] 4 How much help from another person does the patient currently need. .. Total A Lot A Little None 4. Moving to and from a bed to a chair (including a wheelchair)? [] 1   [] 2   [x] 3   [] 4  
5. Need to walk in hospital room? [] 1   [x] 2   [] 3   [] 4  
6. Climbing 3-5 steps with a railing? [x] 1   [] 2   [] 3   [] 4  
© , Trustees of [address] [facility] [address] [address], under license to [employer]. All rights reserved Score:  Initial: 20 Most Recent: 17 (Date: 6/15/20 ) Interpretation of Tool:  Represents activities that are increasingly more difficult (i.e. Bed mobility, Transfers, Gait). Medical Necessity:    
· Patient is expected to demonstrate progress in  
· strength, range of motion, balance, coordination, and functional technique ·  to  
· increase independence with ambulation and mobility · . Reason for Services/Other Comments: 
· Patient continues to require skilled intervention due to · Gross weakness, poor balance, increased risk of falls · . Use of outcome tool(s) and clinical judgement create a POC that gives a: Clear prediction of patient's progress: LOW COMPLEXITY  
  
 
 
 
TREATMENT:  
  
Pre-treatment Symptoms/Complaints: \"thank you\" Pain: Initial:
Pain Intensity 1: 0  Post Session: 0/10 Therapeutic Activity: (  18 Minutes ):  Therapeutic activities including Bed mobility, sit-stand transfer training from bed and chair, standing static/dynamic mobility, Chair transfers, and Ambulation on level ground x 2 trials to improve mobility, strength, balance, coordination and activity tolerance. Required minimal Verbal cues; Safety awareness training; Tactile cues to promote static and dynamic balance in standing and promote motor control of bilateral, lower extremity(s). Therapeutic Exercise: (10 Minutes):  Exercises per grid below to improve mobility, strength and balance. Required minimal visual and verbal cues to promote proper body mechanics and exercise form. Progressed range and repetitions as indicated. Date: 
6/5/20 Date: 
6/8/20 Date: 
6/10 Date: 
6-12-20 Date: 
6/15/20 Date 6/17/20 Date: 
6/18/20 Activity/Exercise Parameters Parameters Parameters Ankle pumps 20 X 20 B X 10 30x 15x AB 15x AB 15x AB Heel slides 2 x 10  X 10 aa 20x Leg slides (hip abd/add) 2 x 10  X 10 aa 10x Seated knee extension 10 B X 15 B   15x AB 15x AB 15x AB Seated hip flexion 10 B X 15 B X 5 5 Standing marching   X 5  15x AB 15x AB 15x AB  
HRs    30 Seated hip aBd     15x Ab 15x AB 15x AB Braces/Orthotics/Lines/Etc:  
· traore catheter · nasal cannula Treatment/Session Assessment:   
· Response to Treatment: good progress with mobility, activity tolerance · Interdisciplinary Collaboration:  
o Physical Therapist 
o Registered Nurse 
o Physician 
o  · After treatment position/precautions:  
o Up in the chair. o Bed/Chair-wheels locked 
o Call light in hand · Compliance with Program/Exercises: Will assess as treatment progresses · Recommendations/Intent for next treatment session: \"Next visit will focus on advancements to more challenging activities\". Total Treatment Duration: PT Patient Time In/Time Out 

Time In: 0775 Time Out: 1458 Mercedes Hernandez, DPT  
    
 
 

Yes

## 2024-05-17 LAB
MICROORGANISM SPEC CULT: NORMAL
SERVICE CMNT-IMP: NORMAL
SPECIMEN DESCRIPTION: NORMAL

## 2024-05-19 ENCOUNTER — HOSPITAL ENCOUNTER (INPATIENT)
Age: 24
LOS: 4 days | Discharge: HOME | DRG: 540 | End: 2024-05-23
Attending: OBSTETRICS & GYNECOLOGY | Admitting: STUDENT IN AN ORGANIZED HEALTH CARE EDUCATION/TRAINING PROGRAM
Payer: COMMERCIAL

## 2024-05-19 ENCOUNTER — APPOINTMENT (OUTPATIENT)
Dept: LABOR AND DELIVERY | Age: 24
DRG: 540 | End: 2024-05-19
Payer: COMMERCIAL

## 2024-05-19 DIAGNOSIS — E87.6 HYPOKALEMIA: ICD-10-CM

## 2024-05-19 PROBLEM — Z3A.37 37 WEEKS GESTATION OF PREGNANCY: Status: ACTIVE | Noted: 2024-05-19

## 2024-05-19 LAB
ABO + RH BLD: NORMAL
ALBUMIN SERPL-MCNC: 3.4 G/DL (ref 3.5–5.2)
ALBUMIN/GLOB SERPL: 1 {RATIO} (ref 1–2.5)
ALP SERPL-CCNC: 201 U/L (ref 35–104)
ALT SERPL-CCNC: 23 U/L (ref 10–35)
AMPHET UR QL SCN: NEGATIVE
ANION GAP SERPL CALCULATED.3IONS-SCNC: 14 MMOL/L (ref 9–16)
ARM BAND NUMBER: NORMAL
AST SERPL-CCNC: 21 U/L (ref 10–35)
BARBITURATES UR QL SCN: NEGATIVE
BASOPHILS # BLD: 0.03 K/UL (ref 0–0.2)
BASOPHILS NFR BLD: 0 % (ref 0–2)
BENZODIAZ UR QL: NEGATIVE
BILIRUB SERPL-MCNC: 0.2 MG/DL (ref 0–1.2)
BLOOD BANK SAMPLE EXPIRATION: NORMAL
BLOOD GROUP ANTIBODIES SERPL: NEGATIVE
BUN SERPL-MCNC: 10 MG/DL (ref 6–20)
CALCIUM SERPL-MCNC: 9 MG/DL (ref 8.6–10.4)
CANNABINOIDS UR QL SCN: NEGATIVE
CHLORIDE SERPL-SCNC: 106 MMOL/L (ref 98–107)
CO2 SERPL-SCNC: 19 MMOL/L (ref 20–31)
COCAINE UR QL SCN: NEGATIVE
CREAT SERPL-MCNC: 0.6 MG/DL (ref 0.5–0.9)
CREAT UR-MCNC: 94.5 MG/DL (ref 28–217)
EOSINOPHIL # BLD: 0.14 K/UL (ref 0–0.44)
EOSINOPHILS RELATIVE PERCENT: 1 % (ref 1–4)
ERYTHROCYTE [DISTWIDTH] IN BLOOD BY AUTOMATED COUNT: 13.8 % (ref 11.8–14.4)
FENTANYL UR QL: NEGATIVE
GFR, ESTIMATED: >90 ML/MIN/1.73M2
GLUCOSE SERPL-MCNC: 108 MG/DL (ref 74–99)
HCT VFR BLD AUTO: 32.1 % (ref 36.3–47.1)
HGB BLD-MCNC: 10.8 G/DL (ref 11.9–15.1)
IMM GRANULOCYTES # BLD AUTO: 0.06 K/UL (ref 0–0.3)
IMM GRANULOCYTES NFR BLD: 1 %
LYMPHOCYTES NFR BLD: 2.14 K/UL (ref 1.1–3.7)
LYMPHOCYTES RELATIVE PERCENT: 20 % (ref 24–43)
MCH RBC QN AUTO: 30.6 PG (ref 25.2–33.5)
MCHC RBC AUTO-ENTMCNC: 33.6 G/DL (ref 28.4–34.8)
MCV RBC AUTO: 90.9 FL (ref 82.6–102.9)
METHADONE UR QL: NEGATIVE
MONOCYTES NFR BLD: 0.52 K/UL (ref 0.1–1.2)
MONOCYTES NFR BLD: 5 % (ref 3–12)
NEUTROPHILS NFR BLD: 73 % (ref 36–65)
NEUTS SEG NFR BLD: 7.72 K/UL (ref 1.5–8.1)
NRBC BLD-RTO: 0 PER 100 WBC
OPIATES UR QL SCN: NEGATIVE
OXYCODONE UR QL SCN: NEGATIVE
PCP UR QL SCN: NEGATIVE
PLATELET # BLD AUTO: 190 K/UL (ref 138–453)
PMV BLD AUTO: 10 FL (ref 8.1–13.5)
POTASSIUM SERPL-SCNC: 3.9 MMOL/L (ref 3.7–5.3)
PROT SERPL-MCNC: 6.5 G/DL (ref 6.6–8.7)
RBC # BLD AUTO: 3.53 M/UL (ref 3.95–5.11)
SODIUM SERPL-SCNC: 139 MMOL/L (ref 136–145)
T PALLIDUM AB SER QL IA: NONREACTIVE
TEST INFORMATION: NORMAL
TOTAL PROTEIN, URINE: 16 MG/DL
URINE TOTAL PROTEIN CREATININE RATIO: 0.17
WBC OTHER # BLD: 10.6 K/UL (ref 3.5–11.3)

## 2024-05-19 PROCEDURE — 82570 ASSAY OF URINE CREATININE: CPT

## 2024-05-19 PROCEDURE — 86780 TREPONEMA PALLIDUM: CPT

## 2024-05-19 PROCEDURE — 1220000000 HC SEMI PRIVATE OB R&B

## 2024-05-19 PROCEDURE — 85025 COMPLETE CBC W/AUTO DIFF WBC: CPT

## 2024-05-19 PROCEDURE — 80053 COMPREHEN METABOLIC PANEL: CPT

## 2024-05-19 PROCEDURE — 86900 BLOOD TYPING SEROLOGIC ABO: CPT

## 2024-05-19 PROCEDURE — 6370000000 HC RX 637 (ALT 250 FOR IP): Performed by: STUDENT IN AN ORGANIZED HEALTH CARE EDUCATION/TRAINING PROGRAM

## 2024-05-19 PROCEDURE — 86901 BLOOD TYPING SEROLOGIC RH(D): CPT

## 2024-05-19 PROCEDURE — 80307 DRUG TEST PRSMV CHEM ANLYZR: CPT

## 2024-05-19 PROCEDURE — 6370000000 HC RX 637 (ALT 250 FOR IP)

## 2024-05-19 PROCEDURE — 84156 ASSAY OF PROTEIN URINE: CPT

## 2024-05-19 PROCEDURE — 2580000003 HC RX 258

## 2024-05-19 PROCEDURE — 86850 RBC ANTIBODY SCREEN: CPT

## 2024-05-19 RX ORDER — SENNA AND DOCUSATE SODIUM 50; 8.6 MG/1; MG/1
1 TABLET, FILM COATED ORAL DAILY PRN
Status: DISCONTINUED | OUTPATIENT
Start: 2024-05-19 | End: 2024-05-23 | Stop reason: HOSPADM

## 2024-05-19 RX ORDER — SODIUM CHLORIDE 0.9 % (FLUSH) 0.9 %
5-40 SYRINGE (ML) INJECTION EVERY 12 HOURS SCHEDULED
Status: DISCONTINUED | OUTPATIENT
Start: 2024-05-19 | End: 2024-05-21

## 2024-05-19 RX ORDER — SODIUM CHLORIDE 0.9 % (FLUSH) 0.9 %
5-40 SYRINGE (ML) INJECTION PRN
Status: DISCONTINUED | OUTPATIENT
Start: 2024-05-19 | End: 2024-05-21

## 2024-05-19 RX ORDER — SODIUM CHLORIDE, SODIUM LACTATE, POTASSIUM CHLORIDE, CALCIUM CHLORIDE 600; 310; 30; 20 MG/100ML; MG/100ML; MG/100ML; MG/100ML
INJECTION, SOLUTION INTRAVENOUS CONTINUOUS
Status: DISCONTINUED | OUTPATIENT
Start: 2024-05-19 | End: 2024-05-21

## 2024-05-19 RX ORDER — LABETALOL 200 MG/1
400 TABLET, FILM COATED ORAL EVERY 8 HOURS SCHEDULED
Status: DISCONTINUED | OUTPATIENT
Start: 2024-05-19 | End: 2024-05-20

## 2024-05-19 RX ORDER — SODIUM CHLORIDE 9 MG/ML
25 INJECTION, SOLUTION INTRAVENOUS PRN
Status: DISCONTINUED | OUTPATIENT
Start: 2024-05-19 | End: 2024-05-21

## 2024-05-19 RX ORDER — SODIUM CHLORIDE, SODIUM LACTATE, POTASSIUM CHLORIDE, AND CALCIUM CHLORIDE .6; .31; .03; .02 G/100ML; G/100ML; G/100ML; G/100ML
500 INJECTION, SOLUTION INTRAVENOUS PRN
Status: DISCONTINUED | OUTPATIENT
Start: 2024-05-19 | End: 2024-05-21

## 2024-05-19 RX ORDER — ACETAMINOPHEN 500 MG
1000 TABLET ORAL EVERY 6 HOURS PRN
Status: DISCONTINUED | OUTPATIENT
Start: 2024-05-19 | End: 2024-05-21

## 2024-05-19 RX ORDER — SODIUM CHLORIDE, SODIUM LACTATE, POTASSIUM CHLORIDE, AND CALCIUM CHLORIDE .6; .31; .03; .02 G/100ML; G/100ML; G/100ML; G/100ML
1000 INJECTION, SOLUTION INTRAVENOUS PRN
Status: DISCONTINUED | OUTPATIENT
Start: 2024-05-19 | End: 2024-05-21

## 2024-05-19 RX ADMIN — LABETALOL HYDROCHLORIDE 400 MG: 200 TABLET, FILM COATED ORAL at 22:31

## 2024-05-19 RX ADMIN — Medication 25 MCG: at 22:41

## 2024-05-19 RX ADMIN — SODIUM CHLORIDE, POTASSIUM CHLORIDE, SODIUM LACTATE AND CALCIUM CHLORIDE: 600; 310; 30; 20 INJECTION, SOLUTION INTRAVENOUS at 21:41

## 2024-05-20 ENCOUNTER — ANESTHESIA EVENT (OUTPATIENT)
Dept: LABOR AND DELIVERY | Age: 24
DRG: 540 | End: 2024-05-20
Payer: COMMERCIAL

## 2024-05-20 ENCOUNTER — ANESTHESIA (OUTPATIENT)
Dept: LABOR AND DELIVERY | Age: 24
DRG: 540 | End: 2024-05-20
Payer: COMMERCIAL

## 2024-05-20 PROCEDURE — 6360000002 HC RX W HCPCS: Performed by: STUDENT IN AN ORGANIZED HEALTH CARE EDUCATION/TRAINING PROGRAM

## 2024-05-20 PROCEDURE — 2580000003 HC RX 258

## 2024-05-20 PROCEDURE — 6360000002 HC RX W HCPCS: Performed by: ANESTHESIOLOGY

## 2024-05-20 PROCEDURE — 6370000000 HC RX 637 (ALT 250 FOR IP)

## 2024-05-20 PROCEDURE — 3E033VJ INTRODUCTION OF OTHER HORMONE INTO PERIPHERAL VEIN, PERCUTANEOUS APPROACH: ICD-10-PCS | Performed by: STUDENT IN AN ORGANIZED HEALTH CARE EDUCATION/TRAINING PROGRAM

## 2024-05-20 PROCEDURE — 6370000000 HC RX 637 (ALT 250 FOR IP): Performed by: STUDENT IN AN ORGANIZED HEALTH CARE EDUCATION/TRAINING PROGRAM

## 2024-05-20 PROCEDURE — 10H07YZ INSERTION OF OTHER DEVICE INTO PRODUCTS OF CONCEPTION, VIA NATURAL OR ARTIFICIAL OPENING: ICD-10-PCS | Performed by: STUDENT IN AN ORGANIZED HEALTH CARE EDUCATION/TRAINING PROGRAM

## 2024-05-20 PROCEDURE — 1220000000 HC SEMI PRIVATE OB R&B

## 2024-05-20 PROCEDURE — 3700000025 EPIDURAL BLOCK: Performed by: STUDENT IN AN ORGANIZED HEALTH CARE EDUCATION/TRAINING PROGRAM

## 2024-05-20 RX ORDER — SEVOFLURANE 250 ML/250ML
1 LIQUID RESPIRATORY (INHALATION) CONTINUOUS PRN
Status: DISCONTINUED | OUTPATIENT
Start: 2024-05-20 | End: 2024-05-21

## 2024-05-20 RX ORDER — NALBUPHINE HYDROCHLORIDE 10 MG/ML
10 INJECTION, SOLUTION INTRAMUSCULAR; INTRAVENOUS; SUBCUTANEOUS ONCE
Status: DISCONTINUED | OUTPATIENT
Start: 2024-05-20 | End: 2024-05-21

## 2024-05-20 RX ORDER — NALBUPHINE HYDROCHLORIDE 10 MG/ML
5 INJECTION, SOLUTION INTRAMUSCULAR; INTRAVENOUS; SUBCUTANEOUS EVERY 4 HOURS PRN
Status: DISCONTINUED | OUTPATIENT
Start: 2024-05-20 | End: 2024-05-21

## 2024-05-20 RX ORDER — NALOXONE HYDROCHLORIDE 0.4 MG/ML
INJECTION, SOLUTION INTRAMUSCULAR; INTRAVENOUS; SUBCUTANEOUS PRN
Status: DISCONTINUED | OUTPATIENT
Start: 2024-05-20 | End: 2024-05-21

## 2024-05-20 RX ORDER — ONDANSETRON 2 MG/ML
4 INJECTION INTRAMUSCULAR; INTRAVENOUS EVERY 6 HOURS PRN
Status: DISCONTINUED | OUTPATIENT
Start: 2024-05-20 | End: 2024-05-21

## 2024-05-20 RX ORDER — LABETALOL 200 MG/1
600 TABLET, FILM COATED ORAL EVERY 8 HOURS SCHEDULED
Status: DISCONTINUED | OUTPATIENT
Start: 2024-05-20 | End: 2024-05-21

## 2024-05-20 RX ORDER — ROPIVACAINE HYDROCHLORIDE 2 MG/ML
INJECTION, SOLUTION EPIDURAL; INFILTRATION; PERINEURAL
Status: COMPLETED
Start: 2024-05-20 | End: 2024-05-21

## 2024-05-20 RX ADMIN — NIFEDIPINE 120 MG: 60 TABLET, EXTENDED RELEASE ORAL at 09:21

## 2024-05-20 RX ADMIN — Medication 1 MILLI-UNITS/MIN: at 12:03

## 2024-05-20 RX ADMIN — SODIUM CHLORIDE, POTASSIUM CHLORIDE, SODIUM LACTATE AND CALCIUM CHLORIDE: 600; 310; 30; 20 INJECTION, SOLUTION INTRAVENOUS at 05:31

## 2024-05-20 RX ADMIN — Medication 25 MCG: at 07:11

## 2024-05-20 RX ADMIN — LABETALOL HYDROCHLORIDE 600 MG: 200 TABLET, FILM COATED ORAL at 19:11

## 2024-05-20 RX ADMIN — SODIUM CHLORIDE, POTASSIUM CHLORIDE, SODIUM LACTATE AND CALCIUM CHLORIDE: 600; 310; 30; 20 INJECTION, SOLUTION INTRAVENOUS at 21:10

## 2024-05-20 RX ADMIN — Medication 25 MCG: at 02:59

## 2024-05-20 RX ADMIN — LABETALOL HYDROCHLORIDE 600 MG: 200 TABLET, FILM COATED ORAL at 06:02

## 2024-05-20 RX ADMIN — ROPIVACAINE HYDROCHLORIDE 12 ML/HR: 2 INJECTION, SOLUTION EPIDURAL; INFILTRATION at 22:32

## 2024-05-20 ASSESSMENT — PAIN DESCRIPTION - ONSET: ONSET: PROGRESSIVE

## 2024-05-20 ASSESSMENT — PAIN DESCRIPTION - LOCATION
LOCATION: ABDOMEN
LOCATION: ABDOMEN

## 2024-05-20 ASSESSMENT — PAIN DESCRIPTION - DESCRIPTORS: DESCRIPTORS: CRAMPING;PRESSURE

## 2024-05-20 ASSESSMENT — PAIN DESCRIPTION - FREQUENCY: FREQUENCY: INTERMITTENT

## 2024-05-20 ASSESSMENT — PAIN SCALES - GENERAL
PAINLEVEL_OUTOF10: 7
PAINLEVEL_OUTOF10: 9
PAINLEVEL_OUTOF10: 0

## 2024-05-20 NOTE — FLOWSHEET NOTE
Prolonged decel noted, RN and Dr. Lopez entered room.  Patient lying on right side, states she had previously been flat on back.    Rincon balloon remains intact.  Traction reapplied.    Patient states nitrous oxide is helping with pain, no further pain meds requested at this time.

## 2024-05-20 NOTE — FLOWSHEET NOTE
Patient arrived on L&D unit for a scheduled induction.      Patient reports +FM, denies vaginal bleeding, denies leaking of fluid.    Given hospital gown and instructed on urine specimen.    EFM applied.

## 2024-05-20 NOTE — CARE COORDINATION
ANTEPARTUM NOTE    37 weeks gestation of pregnancy [Z3A.37]    Carmen was admitted to L&D on 5/19/24 for IOL due to cHTN on medication @ 37w0d    OB GYN Provider: Dr. Gilmore    Will meet with patient after delivery to verify name/address/phone/insurance and discuss discharge planning.     Anticipate DC home 2 nights after vaginal delivery or 4 nights after C/S delivery as long as hemodynamically stable.

## 2024-05-20 NOTE — FLOWSHEET NOTE
Pt reports pain scale of 6/10. She states that she has use the nitrous with approximately 50% of her contractions. Pt reports that the nitrous is helping with pain control of her contractions.

## 2024-05-20 NOTE — H&P
OBSTETRICAL HISTORY AND PHYSICAL  [facility]    Date: 2024       Time: 10:17 PM   Patient Name: Carmen Amor     Patient : 2000  Room/Bed: 0705/0705-01    Admission Date/Time: 2024  9:14 PM      CC: IOL 2 cHTN (meds)      HPI: Carmen Amor is a 24 y.o.  at 37w0d who presents for induction of labor due to chronic hypertension managed on labetalol 400 TID and Procardia 120 XL qd.     The patient reports fetal movement is present, denies contractions, denies loss of fluid, denies vaginal bleeding.  She denies HA, vision changes, SOB, chest pain, lightheadedness, dizziness, nausea, vomiting, dysuria, and hematuria.     DATING:  LMP: No LMP recorded. Patient is pregnant.  Estimated Date of Delivery: 24   Based on: early ultrasound, at 7 2/7 weeks GA    PREGNANCY RISK FACTORS:  Patient Active Problem List   Diagnosis    Major depressive disorder, recurrent, severe with psychotic features (HCC)    Complex posttraumatic stress disorder    Rh negative state in antepartum period    cHTN (no meds)    Bipolar I    Borderline personality disorder    36 weeks gestation of pregnancy    37 weeks gestation of pregnancy        Steroids Given In This Pregnancy:  no     REVIEW OF SYSTEMS:   Constitutional: negative fever, negative chills, negative weight changes   HEENT: negative visual disturbances, negative headaches, negative dizziness, negative hearing loss  Breast: Negative breast abnormalities, negative breast lumps, negative nipple discharge  Respiratory: negative dyspnea, negative cough, negative SOB  Cardiovascular: negative chest pain,  negative palpitations, negative arrhythmia, negative syncope   Gastrointestinal: negative abdominal pain, negative RUQ pain, negative N/V, negative diarrhea, negative constipation, negative bowel changes, negative heartburn   Genitourinary: negative dysuria, negative hematuria, negative urinary incontinence, negative vaginal

## 2024-05-20 NOTE — FLOWSHEET NOTE
RN's at bedside report.  Patient states she no longer wants Nubain and wants to continue to use Nitrous for pain control.  Rates pain 4.

## 2024-05-20 NOTE — FLOWSHEET NOTE
Pt educated on the use of self-administered  nitrous oxide for pain control and side effects. Pt educated on when and how to use the mask appropriately. Pt educated that she is the only person allowed to hold the mask to her face and that no one should prop the mask against her face. Pt also educated that she is the only person in the room allowed to use the mask.Pt informed that she cannot be out of bed without a trained support person with her. Pt completed a return demonstration of the use of nitrous oxide and all questions and concerns were addressed. RN verified the presence of a signed agreement form for the nitrous oxide. Pre-intervention VS, assessment, and FHT'st as charted. Nitrous oxide and oxygen at a 50-50 mix was initiated at 0453 . RN remains at bedside for the first 15 mins post initiation.

## 2024-05-20 NOTE — DISCHARGE SUMMARY
morning, at noon, and at bedtime  What changed: You were already taking a medication with the same name, and this prescription was added. Make sure you understand how and when to take each.     * NIFEdipine 30 MG extended release tablet  Commonly known as: PROCARDIA XL  Take 4 tablets by mouth daily  What changed: Another medication with the same name was added. Make sure you understand how and when to take each.     * NIFEdipine 60 MG extended release tablet  Commonly known as: Nifedical XL  Take 2 tablets by mouth daily  What changed: You were already taking a medication with the same name, and this prescription was added. Make sure you understand how and when to take each.           * This list has 4 medication(s) that are the same as other medications prescribed for you. Read the directions carefully, and ask your doctor or other care provider to review them with you.                CONTINUE taking these medications      Prenatal Vitamins 28-0.8 MG Tabs  Take 1 tablet by mouth daily            ASK your doctor about these medications      ondansetron 4 MG disintegrating tablet  Commonly known as: ZOFRAN-ODT  Take 1 tablet by mouth every 8 hours as needed for Nausea or Vomiting               Where to Get Your Medications        These medications were sent to [facility] [address] -  [phone] - F [phone]  [address] [place] [address]      Phone: [phone]   acetaminophen 500 MG tablet  ferrous sulfate 325 (65 Fe) MG EC tablet  ibuprofen 600 MG tablet  labetalol 300 MG tablet  NIFEdipine 60 MG extended release tablet  oxyCODONE 5 MG immediate release tablet  sennosides-docusate sodium 8.6-50 MG tablet  simethicone 80 MG chewable tablet           Activity: pelvic rest x 6 weeks, no driving narcotics, no lifting greater than 15 lbs  Diet: regular diet  Follow up: 1 week for BP check and Prevena dressing removal    Condition on discharge: good    Discharge date:

## 2024-05-21 PROBLEM — Z98.891 S/P CESAREAN SECTION: Status: ACTIVE | Noted: 2024-05-21

## 2024-05-21 PROBLEM — O61.0 FAILED MEDICAL INDUCTION OF LABOR: Status: ACTIVE | Noted: 2024-05-21

## 2024-05-21 PROCEDURE — 59514 CESAREAN DELIVERY ONLY: CPT | Performed by: STUDENT IN AN ORGANIZED HEALTH CARE EDUCATION/TRAINING PROGRAM

## 2024-05-21 PROCEDURE — 7100000000 HC PACU RECOVERY - FIRST 15 MIN: Performed by: STUDENT IN AN ORGANIZED HEALTH CARE EDUCATION/TRAINING PROGRAM

## 2024-05-21 PROCEDURE — 2500000003 HC RX 250 WO HCPCS

## 2024-05-21 PROCEDURE — 6360000002 HC RX W HCPCS

## 2024-05-21 PROCEDURE — 2580000003 HC RX 258

## 2024-05-21 PROCEDURE — 1220000000 HC SEMI PRIVATE OB R&B

## 2024-05-21 PROCEDURE — 3609079900 HC CESAREAN SECTION: Performed by: STUDENT IN AN ORGANIZED HEALTH CARE EDUCATION/TRAINING PROGRAM

## 2024-05-21 PROCEDURE — 7100000001 HC PACU RECOVERY - ADDTL 15 MIN: Performed by: STUDENT IN AN ORGANIZED HEALTH CARE EDUCATION/TRAINING PROGRAM

## 2024-05-21 PROCEDURE — 6370000000 HC RX 637 (ALT 250 FOR IP)

## 2024-05-21 PROCEDURE — 6370000000 HC RX 637 (ALT 250 FOR IP): Performed by: STUDENT IN AN ORGANIZED HEALTH CARE EDUCATION/TRAINING PROGRAM

## 2024-05-21 PROCEDURE — 88307 TISSUE EXAM BY PATHOLOGIST: CPT

## 2024-05-21 PROCEDURE — 3700000000 HC ANESTHESIA ATTENDED CARE: Performed by: STUDENT IN AN ORGANIZED HEALTH CARE EDUCATION/TRAINING PROGRAM

## 2024-05-21 PROCEDURE — 6360000002 HC RX W HCPCS: Performed by: STUDENT IN AN ORGANIZED HEALTH CARE EDUCATION/TRAINING PROGRAM

## 2024-05-21 PROCEDURE — 3700000001 HC ADD 15 MINUTES (ANESTHESIA): Performed by: STUDENT IN AN ORGANIZED HEALTH CARE EDUCATION/TRAINING PROGRAM

## 2024-05-21 PROCEDURE — 6360000002 HC RX W HCPCS: Performed by: ANESTHESIOLOGY

## 2024-05-21 RX ORDER — ACETAMINOPHEN 500 MG
1000 TABLET ORAL EVERY 6 HOURS PRN
Status: CANCELLED | OUTPATIENT
Start: 2024-05-21

## 2024-05-21 RX ORDER — SCOLOPAMINE TRANSDERMAL SYSTEM 1 MG/1
1 PATCH, EXTENDED RELEASE TRANSDERMAL
Status: DISCONTINUED | OUTPATIENT
Start: 2024-05-21 | End: 2024-05-21

## 2024-05-21 RX ORDER — OXYCODONE HYDROCHLORIDE 5 MG/1
5 TABLET ORAL EVERY 6 HOURS PRN
Qty: 18 TABLET | Refills: 0 | Status: SHIPPED | OUTPATIENT
Start: 2024-05-21 | End: 2024-05-26

## 2024-05-21 RX ORDER — CITRIC ACID/SODIUM CITRATE 334-500MG
30 SOLUTION, ORAL ORAL ONCE
Status: COMPLETED | OUTPATIENT
Start: 2024-05-21 | End: 2024-05-21

## 2024-05-21 RX ORDER — IBUPROFEN 600 MG/1
600 TABLET ORAL EVERY 6 HOURS PRN
Qty: 360 TABLET | Refills: 1 | Status: SHIPPED | OUTPATIENT
Start: 2024-05-21

## 2024-05-21 RX ORDER — NALBUPHINE HYDROCHLORIDE 10 MG/ML
5 INJECTION, SOLUTION INTRAMUSCULAR; INTRAVENOUS; SUBCUTANEOUS EVERY 4 HOURS PRN
Status: DISCONTINUED | OUTPATIENT
Start: 2024-05-21 | End: 2024-05-23 | Stop reason: HOSPADM

## 2024-05-21 RX ORDER — ONDANSETRON 2 MG/ML
4 INJECTION INTRAMUSCULAR; INTRAVENOUS EVERY 6 HOURS PRN
Status: DISCONTINUED | OUTPATIENT
Start: 2024-05-21 | End: 2024-05-21 | Stop reason: SDUPTHER

## 2024-05-21 RX ORDER — METRONIDAZOLE 500 MG/1
500 TABLET ORAL EVERY 8 HOURS SCHEDULED
Status: COMPLETED | OUTPATIENT
Start: 2024-05-21 | End: 2024-05-23

## 2024-05-21 RX ORDER — IBUPROFEN 600 MG/1
600 TABLET ORAL EVERY 6 HOURS SCHEDULED
Status: DISCONTINUED | OUTPATIENT
Start: 2024-05-22 | End: 2024-05-23 | Stop reason: HOSPADM

## 2024-05-21 RX ORDER — ACETAMINOPHEN 500 MG
1000 TABLET ORAL EVERY 6 HOURS PRN
Qty: 120 TABLET | Refills: 1 | Status: SHIPPED | OUTPATIENT
Start: 2024-05-21

## 2024-05-21 RX ORDER — SODIUM CHLORIDE, SODIUM LACTATE, POTASSIUM CHLORIDE, CALCIUM CHLORIDE 600; 310; 30; 20 MG/100ML; MG/100ML; MG/100ML; MG/100ML
INJECTION, SOLUTION INTRAVENOUS CONTINUOUS
Status: DISCONTINUED | OUTPATIENT
Start: 2024-05-21 | End: 2024-05-22

## 2024-05-21 RX ORDER — SODIUM CHLORIDE 0.9 % (FLUSH) 0.9 %
5-40 SYRINGE (ML) INJECTION PRN
Status: CANCELLED | OUTPATIENT
Start: 2024-05-21

## 2024-05-21 RX ORDER — SODIUM CHLORIDE 0.9 % (FLUSH) 0.9 %
5-40 SYRINGE (ML) INJECTION PRN
Status: DISCONTINUED | OUTPATIENT
Start: 2024-05-21 | End: 2024-05-23 | Stop reason: HOSPADM

## 2024-05-21 RX ORDER — SODIUM CHLORIDE 9 MG/ML
INJECTION, SOLUTION INTRAVENOUS PRN
Status: CANCELLED | OUTPATIENT
Start: 2024-05-21

## 2024-05-21 RX ORDER — ENOXAPARIN SODIUM 100 MG/ML
30 INJECTION SUBCUTANEOUS 2 TIMES DAILY
Status: DISCONTINUED | OUTPATIENT
Start: 2024-05-21 | End: 2024-05-22

## 2024-05-21 RX ORDER — ACETAMINOPHEN 500 MG
1000 TABLET ORAL ONCE
Status: COMPLETED | OUTPATIENT
Start: 2024-05-21 | End: 2024-05-21

## 2024-05-21 RX ORDER — CLINDAMYCIN PHOSPHATE 900 MG/50ML
900 INJECTION, SOLUTION INTRAVENOUS ONCE
Status: COMPLETED | OUTPATIENT
Start: 2024-05-21 | End: 2024-05-21

## 2024-05-21 RX ORDER — AZITHROMYCIN 500 MG/1
INJECTION, POWDER, LYOPHILIZED, FOR SOLUTION INTRAVENOUS PRN
Status: DISCONTINUED | OUTPATIENT
Start: 2024-05-21 | End: 2024-05-21 | Stop reason: SDUPTHER

## 2024-05-21 RX ORDER — SODIUM CHLORIDE 9 MG/ML
INJECTION, SOLUTION INTRAVENOUS PRN
Status: DISCONTINUED | OUTPATIENT
Start: 2024-05-21 | End: 2024-05-23 | Stop reason: HOSPADM

## 2024-05-21 RX ORDER — SODIUM CHLORIDE 0.9 % (FLUSH) 0.9 %
5-40 SYRINGE (ML) INJECTION EVERY 12 HOURS SCHEDULED
Status: CANCELLED | OUTPATIENT
Start: 2024-05-21

## 2024-05-21 RX ORDER — SIMETHICONE 80 MG
80 TABLET,CHEWABLE ORAL 4 TIMES DAILY PRN
Qty: 30 TABLET | Refills: 0 | Status: SHIPPED | OUTPATIENT
Start: 2024-05-21

## 2024-05-21 RX ORDER — SIMETHICONE 80 MG
80 TABLET,CHEWABLE ORAL EVERY 6 HOURS PRN
Status: DISCONTINUED | OUTPATIENT
Start: 2024-05-21 | End: 2024-05-23 | Stop reason: HOSPADM

## 2024-05-21 RX ORDER — LABETALOL 200 MG/1
800 TABLET, FILM COATED ORAL EVERY 8 HOURS SCHEDULED
Status: DISCONTINUED | OUTPATIENT
Start: 2024-05-21 | End: 2024-05-23 | Stop reason: HOSPADM

## 2024-05-21 RX ORDER — ACETAMINOPHEN 500 MG
1000 TABLET ORAL EVERY 6 HOURS SCHEDULED
Status: DISCONTINUED | OUTPATIENT
Start: 2024-05-22 | End: 2024-05-23 | Stop reason: HOSPADM

## 2024-05-21 RX ORDER — IBUPROFEN 600 MG/1
600 TABLET ORAL EVERY 6 HOURS PRN
Status: CANCELLED | OUTPATIENT
Start: 2024-05-21

## 2024-05-21 RX ORDER — ONDANSETRON 2 MG/ML
4 INJECTION INTRAMUSCULAR; INTRAVENOUS EVERY 6 HOURS PRN
Status: DISCONTINUED | OUTPATIENT
Start: 2024-05-21 | End: 2024-05-23 | Stop reason: HOSPADM

## 2024-05-21 RX ORDER — ONDANSETRON 4 MG/1
4 TABLET, ORALLY DISINTEGRATING ORAL EVERY 6 HOURS PRN
Status: DISCONTINUED | OUTPATIENT
Start: 2024-05-21 | End: 2024-05-21 | Stop reason: SDUPTHER

## 2024-05-21 RX ORDER — OXYCODONE HYDROCHLORIDE 5 MG/1
5 TABLET ORAL EVERY 4 HOURS PRN
Status: DISCONTINUED | OUTPATIENT
Start: 2024-05-21 | End: 2024-05-23 | Stop reason: HOSPADM

## 2024-05-21 RX ORDER — PHENYLEPHRINE HCL IN 0.9% NACL 1 MG/10 ML
SYRINGE (ML) INTRAVENOUS PRN
Status: DISCONTINUED | OUTPATIENT
Start: 2024-05-21 | End: 2024-05-21 | Stop reason: SDUPTHER

## 2024-05-21 RX ORDER — LANOLIN 72 %
OINTMENT (GRAM) TOPICAL PRN
Status: CANCELLED | OUTPATIENT
Start: 2024-05-21

## 2024-05-21 RX ORDER — ONDANSETRON 4 MG/1
4 TABLET, ORALLY DISINTEGRATING ORAL EVERY 6 HOURS
Status: CANCELLED | OUTPATIENT
Start: 2024-05-21

## 2024-05-21 RX ORDER — SENNA AND DOCUSATE SODIUM 50; 8.6 MG/1; MG/1
1 TABLET, FILM COATED ORAL DAILY
Qty: 60 TABLET | Refills: 1 | Status: SHIPPED | OUTPATIENT
Start: 2024-05-21

## 2024-05-21 RX ORDER — SODIUM CHLORIDE 0.9 % (FLUSH) 0.9 %
5-40 SYRINGE (ML) INJECTION EVERY 12 HOURS SCHEDULED
Status: DISCONTINUED | OUTPATIENT
Start: 2024-05-21 | End: 2024-05-23 | Stop reason: HOSPADM

## 2024-05-21 RX ORDER — TRANEXAMIC ACID 10 MG/ML
1000 INJECTION, SOLUTION INTRAVENOUS ONCE
Status: COMPLETED | OUTPATIENT
Start: 2024-05-21 | End: 2024-05-21

## 2024-05-21 RX ORDER — KETOROLAC TROMETHAMINE 30 MG/ML
30 INJECTION, SOLUTION INTRAMUSCULAR; INTRAVENOUS EVERY 6 HOURS
Status: COMPLETED | OUTPATIENT
Start: 2024-05-21 | End: 2024-05-22

## 2024-05-21 RX ORDER — FENTANYL CITRATE 50 UG/ML
INJECTION, SOLUTION INTRAMUSCULAR; INTRAVENOUS PRN
Status: DISCONTINUED | OUTPATIENT
Start: 2024-05-21 | End: 2024-05-21 | Stop reason: SDUPTHER

## 2024-05-21 RX ORDER — LANOLIN 72 %
OINTMENT (GRAM) TOPICAL
Status: DISCONTINUED | OUTPATIENT
Start: 2024-05-21 | End: 2024-05-23 | Stop reason: HOSPADM

## 2024-05-21 RX ORDER — FENTANYL CITRATE 50 UG/ML
INJECTION, SOLUTION INTRAMUSCULAR; INTRAVENOUS PRN
Status: DISCONTINUED | OUTPATIENT
Start: 2024-05-21 | End: 2024-05-21

## 2024-05-21 RX ORDER — SENNA AND DOCUSATE SODIUM 50; 8.6 MG/1; MG/1
2 TABLET, FILM COATED ORAL 2 TIMES DAILY
Status: DISCONTINUED | OUTPATIENT
Start: 2024-05-21 | End: 2024-05-23 | Stop reason: HOSPADM

## 2024-05-21 RX ORDER — ONDANSETRON 4 MG/1
4 TABLET, ORALLY DISINTEGRATING ORAL EVERY 8 HOURS PRN
Status: DISCONTINUED | OUTPATIENT
Start: 2024-05-21 | End: 2024-05-23 | Stop reason: HOSPADM

## 2024-05-21 RX ORDER — CHLOROPROCAINE HYDROCHLORIDE 30 MG/ML
INJECTION, SOLUTION EPIDURAL; INFILTRATION; INTRACAUDAL; PERINEURAL PRN
Status: DISCONTINUED | OUTPATIENT
Start: 2024-05-21 | End: 2024-05-21 | Stop reason: SDUPTHER

## 2024-05-21 RX ORDER — OXYCODONE HYDROCHLORIDE 5 MG/1
10 TABLET ORAL EVERY 4 HOURS PRN
Status: DISCONTINUED | OUTPATIENT
Start: 2024-05-21 | End: 2024-05-23 | Stop reason: HOSPADM

## 2024-05-21 RX ORDER — DOCUSATE SODIUM 100 MG/1
100 CAPSULE, LIQUID FILLED ORAL 2 TIMES DAILY
Status: CANCELLED | OUTPATIENT
Start: 2024-05-21

## 2024-05-21 RX ORDER — POLYETHYLENE GLYCOL 3350 17 G/17G
17 POWDER, FOR SOLUTION ORAL DAILY PRN
Status: DISCONTINUED | OUTPATIENT
Start: 2024-05-21 | End: 2024-05-23 | Stop reason: HOSPADM

## 2024-05-21 RX ORDER — VITAMIN A, ASCORBIC ACID, CHOLECALCIFEROL, .ALPHA.-TOCOPHEROL ACETATE, DL-, THIAMINE MONONITRATE, RIBOFLAVIN, NIACINAMIDE, PYRIDOXINE HYDROCHLORIDE, FOLIC ACID, CYANOCOBALAMIN, CALCIUM CARBONATE, IRON, ZINC OXIDE, AND CUPRIC OXIDE 4000; 120; 400; 22; 1.84; 3; 20; 10; 1; 12; 200; 29; 25; 2 [IU]/1; MG/1; [IU]/1; [IU]/1; MG/1; MG/1; MG/1; MG/1; MG/1; UG/1; MG/1; MG/1; MG/1; MG/1
1 TABLET ORAL DAILY
Status: DISCONTINUED | OUTPATIENT
Start: 2024-05-22 | End: 2024-05-23 | Stop reason: HOSPADM

## 2024-05-21 RX ADMIN — SODIUM CHLORIDE, PRESERVATIVE FREE 20 MG: 5 INJECTION INTRAVENOUS at 15:44

## 2024-05-21 RX ADMIN — SODIUM CHLORIDE, POTASSIUM CHLORIDE, SODIUM LACTATE AND CALCIUM CHLORIDE: 600; 310; 30; 20 INJECTION, SOLUTION INTRAVENOUS at 18:38

## 2024-05-21 RX ADMIN — CHLOROPROCAINE HYDROCHLORIDE 10 ML: 30 INJECTION, SOLUTION EPIDURAL; INFILTRATION; INTRACAUDAL; PERINEURAL at 16:30

## 2024-05-21 RX ADMIN — LABETALOL HYDROCHLORIDE 600 MG: 200 TABLET, FILM COATED ORAL at 03:20

## 2024-05-21 RX ADMIN — CLINDAMYCIN PHOSPHATE 900 MG: 900 INJECTION, SOLUTION INTRAVENOUS at 15:44

## 2024-05-21 RX ADMIN — KETOROLAC TROMETHAMINE 30 MG: 30 INJECTION, SOLUTION INTRAMUSCULAR; INTRAVENOUS at 18:34

## 2024-05-21 RX ADMIN — Medication 166.7 ML: at 16:46

## 2024-05-21 RX ADMIN — Medication 100 MCG: at 17:18

## 2024-05-21 RX ADMIN — TRANEXAMIC ACID 1000 MG: 10 INJECTION, SOLUTION INTRAVENOUS at 15:50

## 2024-05-21 RX ADMIN — AZITHROMYCIN MONOHYDRATE 500 MG: 500 INJECTION, POWDER, LYOPHILIZED, FOR SOLUTION INTRAVENOUS at 16:40

## 2024-05-21 RX ADMIN — ACETAMINOPHEN 1000 MG: 500 TABLET ORAL at 15:39

## 2024-05-21 RX ADMIN — METRONIDAZOLE 500 MG: 500 TABLET ORAL at 22:34

## 2024-05-21 RX ADMIN — FENTANYL CITRATE 100 MCG: 50 INJECTION, SOLUTION INTRAMUSCULAR; INTRAVENOUS at 16:28

## 2024-05-21 RX ADMIN — ROPIVACAINE HYDROCHLORIDE 10 ML/HR: 2 INJECTION, SOLUTION EPIDURAL; INFILTRATION at 05:25

## 2024-05-21 RX ADMIN — LABETALOL HYDROCHLORIDE 800 MG: 200 TABLET, FILM COATED ORAL at 14:12

## 2024-05-21 RX ADMIN — SODIUM CITRATE AND CITRIC ACID MONOHYDRATE 30 ML: 500; 334 SOLUTION ORAL at 15:47

## 2024-05-21 RX ADMIN — ROPIVACAINE HYDROCHLORIDE 10 ML/HR: 2 INJECTION, SOLUTION EPIDURAL; INFILTRATION at 12:00

## 2024-05-21 RX ADMIN — AZITHROMYCIN MONOHYDRATE 500 MG: 500 INJECTION, POWDER, LYOPHILIZED, FOR SOLUTION INTRAVENOUS at 16:22

## 2024-05-21 RX ADMIN — SODIUM CHLORIDE, POTASSIUM CHLORIDE, SODIUM LACTATE AND CALCIUM CHLORIDE: 600; 310; 30; 20 INJECTION, SOLUTION INTRAVENOUS at 08:36

## 2024-05-21 RX ADMIN — LABETALOL HYDROCHLORIDE 800 MG: 200 TABLET, FILM COATED ORAL at 22:34

## 2024-05-21 RX ADMIN — CHLOROPROCAINE HYDROCHLORIDE 20 ML: 30 INJECTION, SOLUTION EPIDURAL; INFILTRATION; INTRACAUDAL; PERINEURAL at 16:27

## 2024-05-21 RX ADMIN — GENTAMICIN SULFATE 500 MG: 40 INJECTION, SOLUTION INTRAMUSCULAR; INTRAVENOUS at 16:28

## 2024-05-21 RX ADMIN — ONDANSETRON 4 MG: 2 INJECTION INTRAMUSCULAR; INTRAVENOUS at 12:27

## 2024-05-21 RX ADMIN — NIFEDIPINE 120 MG: 60 TABLET, EXTENDED RELEASE ORAL at 08:54

## 2024-05-21 RX ADMIN — DOCUSATE SODIUM 50 MG AND SENNOSIDES 8.6 MG 2 TABLET: 8.6; 5 TABLET, FILM COATED ORAL at 22:33

## 2024-05-21 ASSESSMENT — PAIN DESCRIPTION - LOCATION: LOCATION: ABDOMEN

## 2024-05-21 ASSESSMENT — PAIN SCALES - GENERAL: PAINLEVEL_OUTOF10: 5

## 2024-05-21 ASSESSMENT — PAIN DESCRIPTION - DESCRIPTORS: DESCRIPTORS: CRAMPING

## 2024-05-21 ASSESSMENT — PAIN DESCRIPTION - ORIENTATION: ORIENTATION: LOWER

## 2024-05-21 NOTE — BRIEF OP NOTE
Department of Obstetrics and Gynecology  Obstetrical Brief Operative Report  [facility]    Patient: Carmen Amor   : 2000  MRN: [id number]       Acct: [id number]   Date of Procedure: 24    Pre-operative Diagnosis: 24 y.o. female  at 37w2d   Induction of labor secondary to chronic hypertension controlled on meds  Failed induction of labor  Bipolar disorder  Borderline Personality Disorder  Depression  Anxiety  PTSD   BMI 39      Post-operative Diagnosis:    24 y.o. female  at 37w2d   Induction of labor secondary to chronic hypertension controlled on meds  Failed induction of labor  Bipolar disorder  Borderline Personality Disorder  Depression  Anxiety  PTSD   BMI 39    Same as above and  living infant     Procedure: primary low transverse  section    Surgeon: Dr. Hoover  Assistant(s): Verónica Lindsay MD, PGY1, Reese Jones MD PGY2  Anesthesia: spinal with Duramorph    Information for the patient's :  Jaylyn Amor [[id number]]   female   Birth Weight: 3.19 kg (7 lb 0.5 oz)   Information for the patient's :  Jaylyn Amor [[id number]]        Findings:  Live Born 7 lb 0 oz female infant in cephalic presentation, nuchal x 1, with Apgars of 9 at 1 minute and 9 at five minutes, normal appearing uterus tubes and ovaries   Estimated Blood Loss: pending immediately post-operatively  Total IV Fluids: 1000ml  Urine output: 200 ml clear urine   Drains:  madden catheter  Specimens:  placenta sent to pathology, cord blood, and cord gases  Instrument and Sponge Count: Correct  Complications: none  Condition: Infant stable, transfer to General Care Nursery, Mother stable, transfer to post anesthesia recovery    See operative report for full details.    Verónica Lindsay MD  Ob/Gyn Resident  2024, 5:30 PM           Attending Physician Statement  I have discussed the care of Carmen Amor, including pertinent history and exam

## 2024-05-21 NOTE — FLOWSHEET NOTE
Late decels noted with patient repositioning.  Patient repositioned to left side.  Dr. Michelle at bedside, tracing reviewed.  Pitocin currently at 20.     0430 EFM reviewed per SHERLYN Mendoza RN, Dr. Michelle.  Return to Cat. I tracing.

## 2024-05-21 NOTE — FLOWSHEET NOTE
Small amount vaginal bleeding noted with IUPC placement, Dr. Gilmore aware. Mild resistance with flushing IUPC.

## 2024-05-21 NOTE — FLOWSHEET NOTE
Patient reports increased pain on right side and pressure.  SVE unchanged. Small amount dark red vaginal bleeding noted, continued since IUPC placement.  Patient repositioned and encouraged to use programmed epidural bolus.   Dr. Michelle in department and updated.     Patient states she is comfortable after interventions.

## 2024-05-21 NOTE — FLOWSHEET NOTE
Patient crying, states she is exhausted, requesting epidural at this time.  Dr. Lopez notified.  AZAM Le notified.

## 2024-05-21 NOTE — ANESTHESIA PROCEDURE NOTES
Epidural Block    Patient location during procedure: OB  Start time: 5/20/2024 10:21 PM  Reason for block: labor epidural  Staffing  Performed: resident/CRNA   Resident/CRNA: Rubin Gabriel APRN - CRNA  Performed by: Rubin Gabriel APRN - CRNA  Authorized by: oJsr Clifford MD    Epidural  Patient position: sitting  Prep: Betadine  Patient monitoring: continuous pulse ox and frequent blood pressure checks  Approach: midline  Location: L3-4  Injection technique: TOO air  Guidance: paresthesia technique  Provider prep: mask and sterile gloves  Needle  Needle gauge: 17 G  Needle length: 3.5 in  Needle insertion depth: 8.5 cm  Catheter type: side hole  Catheter size: 20 G  Catheter at skin depth: 14 cm  Test dose: negative  Assessment  Sensory level: T6  Hemodynamics: stable  Attempts: 1  Outcomes: uncomplicated and patient tolerated procedure well  Preanesthetic Checklist  Completed: patient identified, IV checked, site marked, risks and benefits discussed, surgical/procedural consents, equipment checked, pre-op evaluation, timeout performed, anesthesia consent given, oxygen available and monitors applied/VS acknowledged

## 2024-05-21 NOTE — ANESTHESIA PRE PROCEDURE
Department of Anesthesiology  Preprocedure Note       Name:  Carmen Amor   Age:  24 y.o.  :  2000                                          MRN:  [id number]         Date:  2024      Surgeon: * No surgeons listed *    Procedure: * No procedures listed *    Medications prior to admission:   Prior to Admission medications    Medication Sig Start Date End Date Taking? Authorizing Provider   ondansetron (ZOFRAN-ODT) 4 MG disintegrating tablet Take 1 tablet by mouth every 8 hours as needed for Nausea or Vomiting  Patient not taking: Reported on 2024 5/15/24   Mike Hodge DO   labetalol (NORMODYNE) 200 MG tablet Take 2 tablets by mouth every 8 hours 5/15/24   Mike Hodge DO   NIFEdipine (PROCARDIA XL) 30 MG extended release tablet Take 4 tablets by mouth daily 24   Mike Hodge DO   Prenatal Vit-Fe Fumarate-FA (PRENATAL VITAMINS) 28-0.8 MG TABS Take 1 tablet by mouth daily 10/9/23   Kristi Patel APRN - CNP       Current medications:    Current Facility-Administered Medications   Medication Dose Route Frequency Provider Last Rate Last Admin    nitrous oxide 50% inhalation 1 each  1 each Inhalation Continuous PRN Bob Lopez MD        labetalol (NORMODYNE) tablet 600 mg  600 mg Oral 3 times per day Mara Michelle DO   600 mg at 24    oxytocin (PITOCIN) 30 units in 500 mL infusion  1-20 terence-units/min IntraVENous Continuous Stephanie Nick DO 10 mL/hr at 24 10 terence-units/min at 24    nalbuphine (NUBAIN) injection 10 mg  10 mg IntraVENous Once Reese Jones MD        ROPivacaine (NAROPIN) 0.2% injection 0.2%             ROPivacaine 0.2% in sodium chloride 0.9% (OB) epidural 100 mL  10 mL/hr Epidural Continuous Josr Clifford MD        naloxone 0.4 mg in 10 mL sodium chloride syringe   IntraVENous PRN Josr Clifford MD        nalbuphine (NUBAIN) injection 5 mg  5 mg IntraVENous Q4H PRN Josr Clifford MD        ondansetron (ZOFRAN) injection 4

## 2024-05-22 PROBLEM — Z3A.36 36 WEEKS GESTATION OF PREGNANCY: Status: RESOLVED | Noted: 2024-05-14 | Resolved: 2024-05-22

## 2024-05-22 LAB
ALBUMIN SERPL-MCNC: 2.6 G/DL (ref 3.5–5.2)
ALBUMIN/GLOB SERPL: 1 {RATIO} (ref 1–2.5)
ALP SERPL-CCNC: 164 U/L (ref 35–104)
ALT SERPL-CCNC: 23 U/L (ref 10–35)
ANION GAP SERPL CALCULATED.3IONS-SCNC: 12 MMOL/L (ref 9–16)
AST SERPL-CCNC: 28 U/L (ref 10–35)
BILIRUB SERPL-MCNC: 0.3 MG/DL (ref 0–1.2)
BUN SERPL-MCNC: 10 MG/DL (ref 6–20)
CALCIUM SERPL-MCNC: 8.3 MG/DL (ref 8.6–10.4)
CHLORIDE SERPL-SCNC: 107 MMOL/L (ref 98–107)
CO2 SERPL-SCNC: 19 MMOL/L (ref 20–31)
CREAT SERPL-MCNC: 0.7 MG/DL (ref 0.5–0.9)
ERYTHROCYTE [DISTWIDTH] IN BLOOD BY AUTOMATED COUNT: 13.8 % (ref 11.8–14.4)
GFR, ESTIMATED: >90 ML/MIN/1.73M2
GLUCOSE SERPL-MCNC: 80 MG/DL (ref 74–99)
HCT VFR BLD AUTO: 24.7 % (ref 36.3–47.1)
HGB BLD-MCNC: 8.2 G/DL (ref 11.9–15.1)
MCH RBC QN AUTO: 30.7 PG (ref 25.2–33.5)
MCHC RBC AUTO-ENTMCNC: 33.2 G/DL (ref 28.4–34.8)
MCV RBC AUTO: 92.5 FL (ref 82.6–102.9)
NRBC BLD-RTO: 0 PER 100 WBC
PLATELET # BLD AUTO: 139 K/UL (ref 138–453)
PMV BLD AUTO: 9.9 FL (ref 8.1–13.5)
POTASSIUM SERPL-SCNC: 3.6 MMOL/L (ref 3.7–5.3)
PROT SERPL-MCNC: 5.1 G/DL (ref 6.6–8.7)
RBC # BLD AUTO: 2.67 M/UL (ref 3.95–5.11)
RESULT: NORMAL
SODIUM SERPL-SCNC: 138 MMOL/L (ref 136–145)
WBC OTHER # BLD: 15.5 K/UL (ref 3.5–11.3)

## 2024-05-22 PROCEDURE — 36415 COLL VENOUS BLD VENIPUNCTURE: CPT

## 2024-05-22 PROCEDURE — 6370000000 HC RX 637 (ALT 250 FOR IP)

## 2024-05-22 PROCEDURE — 2580000003 HC RX 258

## 2024-05-22 PROCEDURE — 6360000002 HC RX W HCPCS

## 2024-05-22 PROCEDURE — 96372 THER/PROPH/DIAG INJ SC/IM: CPT

## 2024-05-22 PROCEDURE — 80053 COMPREHEN METABOLIC PANEL: CPT

## 2024-05-22 PROCEDURE — 1220000000 HC SEMI PRIVATE OB R&B

## 2024-05-22 PROCEDURE — 85461 HEMOGLOBIN FETAL: CPT

## 2024-05-22 PROCEDURE — 85027 COMPLETE CBC AUTOMATED: CPT

## 2024-05-22 RX ORDER — ENOXAPARIN SODIUM 100 MG/ML
0.5 INJECTION SUBCUTANEOUS DAILY
Status: DISCONTINUED | OUTPATIENT
Start: 2024-05-22 | End: 2024-05-23 | Stop reason: HOSPADM

## 2024-05-22 RX ORDER — UREA 10 %
325 LOTION (ML) TOPICAL 2 TIMES DAILY
Qty: 90 TABLET | Refills: 3 | Status: SHIPPED | OUTPATIENT
Start: 2024-05-22

## 2024-05-22 RX ORDER — UREA 10 %
325 LOTION (ML) TOPICAL
Status: DISCONTINUED | OUTPATIENT
Start: 2024-05-23 | End: 2024-05-23 | Stop reason: HOSPADM

## 2024-05-22 RX ADMIN — KETOROLAC TROMETHAMINE 30 MG: 30 INJECTION, SOLUTION INTRAMUSCULAR; INTRAVENOUS at 00:47

## 2024-05-22 RX ADMIN — METRONIDAZOLE 500 MG: 500 TABLET ORAL at 15:11

## 2024-05-22 RX ADMIN — KETOROLAC TROMETHAMINE 30 MG: 30 INJECTION, SOLUTION INTRAMUSCULAR; INTRAVENOUS at 13:02

## 2024-05-22 RX ADMIN — NIFEDIPINE 120 MG: 60 TABLET, EXTENDED RELEASE ORAL at 10:15

## 2024-05-22 RX ADMIN — METRONIDAZOLE 500 MG: 500 TABLET ORAL at 22:11

## 2024-05-22 RX ADMIN — SODIUM CHLORIDE, PRESERVATIVE FREE 10 ML: 5 INJECTION INTRAVENOUS at 13:05

## 2024-05-22 RX ADMIN — LABETALOL HYDROCHLORIDE 800 MG: 200 TABLET, FILM COATED ORAL at 15:11

## 2024-05-22 RX ADMIN — METRONIDAZOLE 500 MG: 500 TABLET ORAL at 06:30

## 2024-05-22 RX ADMIN — SODIUM CHLORIDE, PRESERVATIVE FREE 10 ML: 5 INJECTION INTRAVENOUS at 22:11

## 2024-05-22 RX ADMIN — SODIUM CHLORIDE, PRESERVATIVE FREE 10 ML: 5 INJECTION INTRAVENOUS at 06:39

## 2024-05-22 RX ADMIN — SODIUM CHLORIDE, POTASSIUM CHLORIDE, SODIUM LACTATE AND CALCIUM CHLORIDE: 600; 310; 30; 20 INJECTION, SOLUTION INTRAVENOUS at 00:52

## 2024-05-22 RX ADMIN — IBUPROFEN 600 MG: 600 TABLET, FILM COATED ORAL at 19:00

## 2024-05-22 RX ADMIN — SENNOSIDES AND DOCUSATE SODIUM 1 TABLET: 50; 8.6 TABLET ORAL at 10:15

## 2024-05-22 RX ADMIN — LABETALOL HYDROCHLORIDE 800 MG: 200 TABLET, FILM COATED ORAL at 06:39

## 2024-05-22 RX ADMIN — ACETAMINOPHEN 1000 MG: 500 TABLET ORAL at 00:47

## 2024-05-22 RX ADMIN — ACETAMINOPHEN 1000 MG: 500 TABLET ORAL at 13:02

## 2024-05-22 RX ADMIN — KETOROLAC TROMETHAMINE 30 MG: 30 INJECTION, SOLUTION INTRAMUSCULAR; INTRAVENOUS at 06:39

## 2024-05-22 RX ADMIN — LABETALOL HYDROCHLORIDE 800 MG: 200 TABLET, FILM COATED ORAL at 22:11

## 2024-05-22 RX ADMIN — ACETAMINOPHEN 1000 MG: 500 TABLET ORAL at 06:38

## 2024-05-22 RX ADMIN — ACETAMINOPHEN 1000 MG: 500 TABLET ORAL at 19:01

## 2024-05-22 RX ADMIN — HUMAN RHO(D) IMMUNE GLOBULIN 300 MCG: 300 INJECTION, SOLUTION INTRAMUSCULAR at 13:06

## 2024-05-22 ASSESSMENT — PAIN DESCRIPTION - LOCATION
LOCATION: ABDOMEN;INCISION
LOCATION: ABDOMEN;INCISION
LOCATION: ABDOMEN

## 2024-05-22 ASSESSMENT — PAIN DESCRIPTION - DESCRIPTORS
DESCRIPTORS: SORE
DESCRIPTORS: ACHING;DISCOMFORT
DESCRIPTORS: CRAMPING;DISCOMFORT;SORE

## 2024-05-22 ASSESSMENT — PAIN - FUNCTIONAL ASSESSMENT
PAIN_FUNCTIONAL_ASSESSMENT: ACTIVITIES ARE NOT PREVENTED
PAIN_FUNCTIONAL_ASSESSMENT: ACTIVITIES ARE NOT PREVENTED

## 2024-05-22 ASSESSMENT — PAIN SCALES - GENERAL
PAINLEVEL_OUTOF10: 5
PAINLEVEL_OUTOF10: 1

## 2024-05-22 ASSESSMENT — PAIN DESCRIPTION - ORIENTATION
ORIENTATION: LOWER
ORIENTATION: LOWER
ORIENTATION: MID;RIGHT

## 2024-05-22 NOTE — CARE COORDINATION
Social Work     Sw reviewed medical record (current active problem list) and tox screens and found no current concerns.     Sw spoke with mom briefly to explain Sw role, inquire if any needs or concerns, and provide safe sleep education and discuss.  Mom denied any needs or questions and informs baby has a safe sleep environment (crib and 3 bassinets).     Mom denied any current s/s of anxiety or depression and is aware to reach out to OB if any s/s occur after dc.    Mom is linked with [email or web address].      Mom reports a good support system, her mom present, asleep,  and denied any current questions or needs.      Mom reports this is her 1st baby.       Mom states ped not yet chosen, she will need list via CM.    Mom reports she resides with her parents.        Sw encouraged mom to reach out if any issues or concerns arise.

## 2024-05-22 NOTE — ANESTHESIA POSTPROCEDURE EVALUATION
Department of Anesthesiology  Postprocedure Note    Patient: Carmen Amor  MRN: [id number]  YOB: 2000  Date of evaluation: 2024    Procedure Summary       Date: 24 Room / Location: [address]    Anesthesia Start:  Anesthesia Stop: 24 1744    Procedures:        SECTION      Labor Analgesia Diagnosis:       Failed medical induction of labor      (Failed medical induction of labor [O61.0])    Surgeons: Bessie Hoover DO Responsible Provider: Phillip Machado MD    Anesthesia Type: epidural ASA Status: 2            Anesthesia Type: No value filed.    Sherry Phase I: Sherry Score: 9    Sherry Phase II: Sherry Score: 10    Anesthesia Post Evaluation    Patient location during evaluation: bedside  Patient participation: complete - patient participated  Level of consciousness: awake  Airway patency: patent  Nausea & Vomiting: no nausea and no vomiting  Cardiovascular status: blood pressure returned to baseline  Respiratory status: acceptable  Hydration status: euvolemic  Comments: /77   Pulse 89   Temp 98.9 °F (37.2 °C) (Oral)   Resp 18   Ht 1.6 m (5' 3\")   Wt 102.1 kg (225 lb)   SpO2 97%   Breastfeeding Unknown   BMI 39.86 kg/m²     Pain management: adequate        No notable events documented.

## 2024-05-22 NOTE — CARE COORDINATION
CASE MANAGEMENT POST-PARTUM TRANSITIONAL CARE PLAN    37 weeks gestation of pregnancy [Z3A.37]    OB Provider: Dr. Mando Fitzgeraldr met w/ Carmen and her mom  at her bedside to discuss DCP. She is S/P CS on 24 @ 37w2d at 1644 of female infant    Writer verified address/phone number correct on facesheet. She states she lives with her mom and step dad. She stated she herself is the youngest of 10 siblings and has step siblings also. She denied barriers with transportation home, to doctors appointments or with paying for medications upon discharge home. She states she uses [facility] [employer] when needed.    [facility] [place] Medicaid insurance correct. Writer notified her she has 30 days from date of birth to add  to insurance policy by contacting S. She verbalized understanding.    Infant name on BC: Patrica Barker    FOB: Kamran Barker. Her mom asked how they complete birth certificate as he is currently in [place]. CM apologized but informed will need to ask Birth Certificates officewhen they collect paper. They verbalized understanding.     Infant PCP Undecided list given. Carmen stated she called 3 offices and they weren't accepting new patients but couldn't remember the names. She said 2 in [place] and 1 in [place].     DME: BP cuff and glucometer & supplies  HOME CARE: none    Anticipate DC home of couplet in private vehicle in 2-4 days status post C/S.    Readmission Risk              Risk of Unplanned Readmission:  7

## 2024-05-23 VITALS
WEIGHT: 225 LBS | DIASTOLIC BLOOD PRESSURE: 68 MMHG | HEART RATE: 87 BPM | BODY MASS INDEX: 39.87 KG/M2 | RESPIRATION RATE: 18 BRPM | TEMPERATURE: 97.7 F | HEIGHT: 63 IN | SYSTOLIC BLOOD PRESSURE: 115 MMHG | OXYGEN SATURATION: 98 %

## 2024-05-23 PROBLEM — D64.9 POSTOPERATIVE ANEMIA: Status: ACTIVE | Noted: 2024-05-23

## 2024-05-23 LAB
ALBUMIN SERPL-MCNC: 2.5 G/DL (ref 3.5–5.2)
ALBUMIN/GLOB SERPL: 1 {RATIO} (ref 1–2.5)
ALP SERPL-CCNC: 123 U/L (ref 35–104)
ALT SERPL-CCNC: 15 U/L (ref 10–35)
ANION GAP SERPL CALCULATED.3IONS-SCNC: 10 MMOL/L (ref 9–16)
AST SERPL-CCNC: 22 U/L (ref 10–35)
BILIRUB SERPL-MCNC: <0.2 MG/DL (ref 0–1.2)
BUN SERPL-MCNC: 14 MG/DL (ref 6–20)
CALCIUM SERPL-MCNC: 8.5 MG/DL (ref 8.6–10.4)
CHLORIDE SERPL-SCNC: 108 MMOL/L (ref 98–107)
CO2 SERPL-SCNC: 21 MMOL/L (ref 20–31)
COMPONENT: NORMAL
CREAT SERPL-MCNC: 0.7 MG/DL (ref 0.5–0.9)
ERYTHROCYTE [DISTWIDTH] IN BLOOD BY AUTOMATED COUNT: 14 % (ref 11.8–14.4)
GFR, ESTIMATED: >90 ML/MIN/1.73M2
GLUCOSE SERPL-MCNC: 94 MG/DL (ref 74–99)
HCT VFR BLD AUTO: 23.4 % (ref 36.3–47.1)
HGB BLD-MCNC: 7.4 G/DL (ref 11.9–15.1)
MCH RBC QN AUTO: 30.2 PG (ref 25.2–33.5)
MCHC RBC AUTO-ENTMCNC: 31.6 G/DL (ref 28.4–34.8)
MCV RBC AUTO: 95.5 FL (ref 82.6–102.9)
NRBC BLD-RTO: 0 PER 100 WBC
PLATELET # BLD AUTO: 137 K/UL (ref 138–453)
PMV BLD AUTO: 9.6 FL (ref 8.1–13.5)
POTASSIUM SERPL-SCNC: 3.1 MMOL/L (ref 3.7–5.3)
PROT SERPL-MCNC: 5.1 G/DL (ref 6.6–8.7)
RBC # BLD AUTO: 2.45 M/UL (ref 3.95–5.11)
SODIUM SERPL-SCNC: 139 MMOL/L (ref 136–145)
STATUS OF UNITS: NORMAL
TRANSFUSION STATUS: NORMAL
UNIT DIVISION: 0
UNIT NUMBER: NORMAL
WBC OTHER # BLD: 11.2 K/UL (ref 3.5–11.3)

## 2024-05-23 PROCEDURE — 6370000000 HC RX 637 (ALT 250 FOR IP)

## 2024-05-23 PROCEDURE — 6360000002 HC RX W HCPCS: Performed by: STUDENT IN AN ORGANIZED HEALTH CARE EDUCATION/TRAINING PROGRAM

## 2024-05-23 PROCEDURE — 2580000003 HC RX 258

## 2024-05-23 PROCEDURE — 85027 COMPLETE CBC AUTOMATED: CPT

## 2024-05-23 PROCEDURE — 36415 COLL VENOUS BLD VENIPUNCTURE: CPT

## 2024-05-23 PROCEDURE — 6370000000 HC RX 637 (ALT 250 FOR IP): Performed by: STUDENT IN AN ORGANIZED HEALTH CARE EDUCATION/TRAINING PROGRAM

## 2024-05-23 PROCEDURE — 99024 POSTOP FOLLOW-UP VISIT: CPT | Performed by: OBSTETRICS & GYNECOLOGY

## 2024-05-23 PROCEDURE — 80053 COMPREHEN METABOLIC PANEL: CPT

## 2024-05-23 RX ORDER — NIFEDIPINE 60 MG/1
120 TABLET, EXTENDED RELEASE ORAL DAILY
Qty: 30 TABLET | Refills: 3 | Status: SHIPPED | OUTPATIENT
Start: 2024-05-23 | End: 2024-05-24 | Stop reason: HOSPADM

## 2024-05-23 RX ORDER — POTASSIUM CHLORIDE 20 MEQ/1
40 TABLET, EXTENDED RELEASE ORAL PRN
Status: DISCONTINUED | OUTPATIENT
Start: 2024-05-23 | End: 2024-05-23 | Stop reason: HOSPADM

## 2024-05-23 RX ORDER — LABETALOL 300 MG/1
800 TABLET, FILM COATED ORAL 3 TIMES DAILY
Qty: 60 TABLET | Refills: 3 | Status: SHIPPED | OUTPATIENT
Start: 2024-05-23 | End: 2024-05-24 | Stop reason: HOSPADM

## 2024-05-23 RX ORDER — POTASSIUM CHLORIDE 7.45 MG/ML
10 INJECTION INTRAVENOUS PRN
Status: DISCONTINUED | OUTPATIENT
Start: 2024-05-23 | End: 2024-05-23 | Stop reason: HOSPADM

## 2024-05-23 RX ADMIN — IBUPROFEN 600 MG: 600 TABLET, FILM COATED ORAL at 12:03

## 2024-05-23 RX ADMIN — POTASSIUM CHLORIDE 40 MEQ: 1500 TABLET, EXTENDED RELEASE ORAL at 08:35

## 2024-05-23 RX ADMIN — IBUPROFEN 600 MG: 600 TABLET, FILM COATED ORAL at 05:46

## 2024-05-23 RX ADMIN — ENOXAPARIN SODIUM 50 MG: 100 INJECTION SUBCUTANEOUS at 08:37

## 2024-05-23 RX ADMIN — Medication 1 TABLET: at 08:35

## 2024-05-23 RX ADMIN — METRONIDAZOLE 500 MG: 500 TABLET ORAL at 05:47

## 2024-05-23 RX ADMIN — METRONIDAZOLE 500 MG: 500 TABLET ORAL at 14:06

## 2024-05-23 RX ADMIN — LABETALOL HYDROCHLORIDE 800 MG: 200 TABLET, FILM COATED ORAL at 05:47

## 2024-05-23 RX ADMIN — NIFEDIPINE 120 MG: 60 TABLET, EXTENDED RELEASE ORAL at 08:36

## 2024-05-23 RX ADMIN — SODIUM CHLORIDE, PRESERVATIVE FREE 10 ML: 5 INJECTION INTRAVENOUS at 08:38

## 2024-05-23 RX ADMIN — FERROUS SULFATE TAB EC 325 MG (65 MG FE EQUIVALENT) 325 MG: 325 (65 FE) TABLET DELAYED RESPONSE at 08:36

## 2024-05-23 RX ADMIN — ACETAMINOPHEN 1000 MG: 500 TABLET ORAL at 12:03

## 2024-05-23 RX ADMIN — LABETALOL HYDROCHLORIDE 800 MG: 200 TABLET, FILM COATED ORAL at 14:06

## 2024-05-23 RX ADMIN — ACETAMINOPHEN 1000 MG: 500 TABLET ORAL at 05:46

## 2024-05-23 RX ADMIN — SENNOSIDES AND DOCUSATE SODIUM 1 TABLET: 50; 8.6 TABLET ORAL at 08:36

## 2024-05-23 ASSESSMENT — PAIN DESCRIPTION - LOCATION: LOCATION: ABDOMEN

## 2024-05-23 ASSESSMENT — PAIN SCALES - GENERAL: PAINLEVEL_OUTOF10: 6

## 2024-05-23 ASSESSMENT — PAIN DESCRIPTION - ORIENTATION: ORIENTATION: MID;LOWER

## 2024-05-23 ASSESSMENT — PAIN - FUNCTIONAL ASSESSMENT: PAIN_FUNCTIONAL_ASSESSMENT: ACTIVITIES ARE NOT PREVENTED

## 2024-05-23 ASSESSMENT — PAIN DESCRIPTION - DESCRIPTORS: DESCRIPTORS: DISCOMFORT;SORE

## 2024-05-23 NOTE — PLAN OF CARE
Problem: Vaginal Birth or  Section  Goal: Fetal and maternal status remain reassuring during the birth process  Outcome: Completed     Problem: Pain  Goal: Verbalizes/displays adequate comfort level or baseline comfort level  Outcome: Completed     Problem: Infection - Adult  Goal: Absence of infection at discharge  Outcome: Completed  Goal: Absence of infection during hospitalization  Outcome: Completed  Goal: Absence of fever/infection during anticipated neutropenic period  Outcome: Completed     Problem: Safety - Adult  Goal: Free from fall injury  Outcome: Completed     Problem: Chronic Conditions and Co-morbidities  Goal: Patient's chronic conditions and co-morbidity symptoms are monitored and maintained or improved  Outcome: Completed

## 2024-05-23 NOTE — FLOWSHEET NOTE
Discharge instructions given with understanding voiced. Pt states she has a BP cuff at home. Instructed to take BP 2x/day and record and take to follow up appt.LEN Save your life: Post-Birth Warning Signs handout reviewed and given to mother. Understanding verbalized.Chlorihexidine Gluconate body wash given to patient with instructions to use at home and here when showering. Patient verbalizes understanding.

## 2024-05-23 NOTE — FLOWSHEET NOTE
[facility]  Depression Scale completed and documented. Paper copy placed in chart. Pt has no questions or concerns at this time. Pt score 6.

## 2024-05-23 NOTE — PROGRESS NOTES
Patient Name: Carmen Amor  Patient : 2000  Room/Bed: [facility]  Admission Date/Time: 2024  9:14 PM  MRN #: [id number]  [facility] #: [id number]    Date: 2024  Time: 3:28 PM        Carmen Amor is a 24 y.o. female  OB History    Para Term  AB Living   2 0 0 0 1 0   SAB IAB Ectopic Molar Multiple Live Births   1 0 0 0 0 0      # Outcome Date GA Lbr Guille/2nd Weight Sex Delivery Anes PTL Lv   2 Current            1 2019               Gestational Age:  37w1d      The patient was seen and examined. The details of her pelvic exam can be found in the EPIC flow section of her EMR. Her pain  is well controlled.  The baby is moving well.    Tracing Review (Date of Tracing):  There Is moderate Variability  Vitals:    24 0922 24 1318 24 1331 24 1403   BP: (!) 150/72 (!) 147/67 (!) 140/81 132/67   Pulse: 61 (!) 108 99 (!) 115   Resp: 16 16 16 16   Temp:  98.7 °F (37.1 °C)     TempSrc:  Oral     SpO2:       Weight:       Height:         FHT's are 130  The tracing is Category 1 .    Intervention:  None      Membranes Are: Ruptured clear fluid- spontaneous  Scalp Electrode in place: No  Intrauterine Pressure Catheter in Place: No          4 Week Lab Review:  Admission on 2024   Component Date Value Ref Range Status    Blood Bank Sample Expiration 2024,2358   Final    Arm Band Number 2024 [id number]   Final    ABO/Rh 2024 A NEGATIVE   Final    Antibody Screen 2024 NEGATIVE   Final    Amphetamine Screen, Ur 2024 NEGATIVE  NEGATIVE Final    Cutoff: 1000 ng/mL    Barbiturate Screen, Ur 2024 NEGATIVE  NEGATIVE Final    Cutoff: 200 ng/ml    Benzodiazepine Screen, Urine 2024 NEGATIVE  NEGATIVE Final    Cutoff: 200 ng/ml    Cocaine Metabolite, Urine 2024 NEGATIVE  NEGATIVE Final    Cutoff: 300 ng/ml    Methadone Screen, Urine 2024 NEGATIVE  NEGATIVE Final    Cutoff: 300 ng/ml    Opiates, Urine 2024 NEGATIVE  
CLINICAL PHARMACY NOTE: MEDS TO BEDS    Total # of Prescriptions Filled: 6   The following medications were delivered to the patient:  Ibuprofen  Mylicon  Acetaminophen  Senna plus  Iron  oxycodone    Additional Documentation:   No copay- writer delivered to bedside  
Labor Progress Note    Carmen Amor is a 24 y.o. female  at 37w1d  The patient was seen and examined. Her pain is well controlled. She reports fetal movement is present, complains of contractions, complains of loss of fluid, denies vaginal bleeding.       Vital Signs:  Vitals:    24 1720 24 1731 24 1812 24 1814   BP: (!) 147/75 (!) 155/81 (!) 154/85 (!) 154/85   Pulse: 84 98 92 86   Resp: 16 16  16   Temp: 98.3 °F (36.8 °C)      TempSrc:       SpO2:       Weight:       Height:           FHT:  135 , moderate variability, accelerations present, decelerations absent  Contractions: regular, every 2-3 minutes    Chaperone for Intimate Exam: Chaperone was present for entire exam, Chaperone Name: MIKE Barkley  Cervical Exam: 5 cm dilated, 70 effaced, 0 station  Pitocin: @ 1 mu/min    Membranes: Ruptured clear fluid  Scalp Electrode in place: absent  Intrauterine Pressure Catheter in Place: absent    Interventions: SVE    Assessment/Plan:  Carmen Amor is a 24 y.o. female  at 37w1d admitted for IOL 2/2 cHTN   - GBS negative, No indication for GBS prophylaxis   - VSS, afebrile   - CEFM/TOCO showing cat 1 tracing   - S/p Rincon   - S/p Cytotec 25mcg PO x3   - SROM (clr) @ 1525   - Pit per protocol   - SVE /0   - Patient currently on Nitrous, desires Nubain for pain control. Will withdrawal nitrous for 30min prior to administering Nubain   - Continue to monitor      Attending updated and in agreement with plan    Reese Jones MD  Ob/Gyn Resident  2024, 6:35 PM    
Labor Progress Note    Carmen Amor is a 24 y.o. female  at 37w1d  The patient was seen and examined. Her pain is well controlled. She reports fetal movement is present, complains of contractions, denies loss of fluid, denies vaginal bleeding.       Vital Signs:  Vitals:    24 0921 24 0922 24 1318 24 1331   BP: (!) 150/72 (!) 150/72 (!) 147/67 (!) 140/81   Pulse:  61 (!) 108 99   Resp:  16 16 16   Temp:   98.7 °F (37.1 °C)    TempSrc:   Oral    SpO2:       Weight:       Height:             FHT:  140 , moderate variability, accelerations present, decelerations absent  Contractions: irregular    Chaperone for Intimate Exam: Chaperone was present for entire exam, Chaperone Name: Rubia MCKEON  Cervical Exam: 4 cm dilated, 50 effaced, -2 station  Pitocin: @ 1 mu/min    Membranes: Intact  Scalp Electrode in place: absent  Intrauterine Pressure Catheter in Place: absent    Interventions: SVE    Assessment/Plan:  Carmen Amor is a 24 y.o. female  at 37w1d admitted for IOL 2/2 cHTN (meds)   - GBS negative, No indication for GBS prophylaxis   - Elevated non severe blood pressures, afebrile   - cEFM/TOCO cat 1, irregular contractions   - Status post madden balloon   - SVE: /-2   - Discussion was had with the patient regarding AROM at this time and she would like to walk around a while before AROM is performed   - She is otherwise comfortable   - S/p Cytotec x3   - Nitrous    - Continue with pitocin per protocol   - Continue to monitor closely    Attending updated and in agreement with plan    Verónica Lindsay MD  Ob/Gyn Resident  2024, 2:06 PM    
Labor Progress Note    Carmen Amor is a 24 y.o. female  at 37w1d  The patient was seen and examined. Her pain is well controlled. She reports fetal movement is present, denies contractions, denies loss of fluid, denies vaginal bleeding.       Vital Signs:  Vitals:    248 24 2231 24 0040   BP:  (!) 148/91 (!) 154/92 128/66   Pulse:  (!) 102 89 72   Resp:  18 16    Temp:  98.4 °F (36.9 °C)     TempSrc:  Oral     SpO2:  98%     Weight: 102.1 kg (225 lb)      Height: 1.6 m (5' 3\")          FHT: 120, moderate variability, accelerations present, decelerations absent  Contractions: irregular, every 7+ minutes    Chaperone for Intimate Exam: Chaperone was present for entire exam, Chaperone Name: MIKE Hernandez  Cervical Exam: 1 cm dilated, 30 effaced, -2 station  Pitocin: @ 0 mu/min    Membranes: Intact  Scalp Electrode in place: absent  Intrauterine Pressure Catheter in Place: absent    Interventions: SVE, madden balloon placement    Assessment/Plan:  Carmen Amor is a 24 y.o. female  at 37w1d admitted for IOL 2/2 cHTN (meds)   - GBS negative, No indication for GBS prophylaxis   - cEFM/TOCO - cat 1   - Cytotec 25mcg Bu x 1, Next Cytotec is ordered at this time   - R/B/A of madden balloon placement reviewed and patient is amenable to placement. Various forms of analgesia are reviewed including tylenol, nitrous oxide, IV Nubain, Morphine/compazine, and epidural. Pain medication offered prior to placement and patient declined. Madden placed without incident. Patient tolerated placement well.   - Plan to continue to monitor    cHTN (meds)  - BP elevated, non-severe   - Denies s/s PreE               - PreE labs wnl, P/C 0.17 on admission              - Continue Labetalol 400 TID & Procardia 120 XL qd              - Continue to monitor closely    Attending updated and in agreement with plan.    Bob Lopez MD  Ob/Gyn Resident  2024, 2:40 AM    
Labor Progress Note    Carmen Amor is a 24 y.o. female  at 37w2d  The patient was seen and examined. Her pain is well controlled. She reports fetal movement is present, complains of contractions, complains of loss of fluid, denies vaginal bleeding.       Vital Signs:  Vitals:    24 0320 24 0330 24 0400 24 0430   BP: (!) 154/81 (!) 148/81 (!) 147/82 135/74   Pulse: 67 63 76 84   Resp:    16   Temp:  98.4 °F (36.9 °C)     TempSrc:  Oral     SpO2:  95% 93% 95%   Weight:       Height:             FHT: 150, moderate variability, accelerations present, decelerations absent  Contractions: regular, every 2 minutes    Chaperone for Intimate Exam: Chaperone was present for entire exam, Chaperone Name: MIKE Candelario  Cervical Exam: 5 cm dilated, 90 effaced, -2 station  Pitocin: @ 20 mu/min    Membranes: Ruptured clear fluid  Scalp Electrode in place: absent  Intrauterine Pressure Catheter in Place: present    Interventions: SVE    Assessment/Plan:  Carmen Amor is a 24 y.o. female  at 37w2d admitted for IOL 2/2 cHTN (meds)    -Called to patient's room by RN for evaluation of fetal heart rate tracing RN concern for potential prolonged deceleration upon further review noted baseline obtained with resolution to prior baseline without decelerations at approximately 5:20 AM.  Patient repositioned her left side fetal heart rate tracing category 1 at this time.    -SVE 5 90 0  - VSS, Afebrile  - cEFM/TOCO  - S/p Cytotec 25mcg x 3   - Epidural in place  - s/p Rincon balloon in place   - Pitocin per protocol   - Continue to monitor    Attending updated and in agreement with plan    Mara Michelle DO  Ob/Gyn Resident  2024, 5:23 AM    
Labor Progress Note    Carmen Amor is a 24 y.o. female  at 37w2d  The patient was seen and examined. Her pain is well controlled. She reports fetal movement is present, complains of contractions, complains of loss of fluid, denies vaginal bleeding.       Vital Signs:  Vitals:    24 0700 24 0836 24 0837 24 0854   BP: (!) 145/86 (!) 158/72  (!) 158/72   Pulse: 88 76     Resp: 16      Temp: 99.6 °F (37.6 °C)  98.8 °F (37.1 °C)    TempSrc: Oral  Oral    SpO2: 94% 96%     Weight:       Height:           FHT: 140, moderate variability, accelerations present, decelerations absent  Contractions: regular, every 4 minutes    Chaperone for Intimate Exam: Chaperone was present for entire exam, Chaperone Name: Rubia  Cervical Exam: 5 cm dilated, 70 effaced, -1 station  Pitocin: @ 1 mu/min    Membranes: Ruptured clear fluid  Scalp Electrode in place: absent  Intrauterine Pressure Catheter in Place: absent    Interventions: none    Assessment/Plan:  Carmen Amor is a 24 y.o. female  at 37w2d admitted for IOL 2/2 cHTN (meds)   - GBS negative, No indication for GBS prophylaxis   - VSS, afebrile   - CEFM/TOCO showing cat 1 tracing   - Epidural in place   - SROM (clr)   - S/p madden   - S/p cytotec 25mcg PO x3   - S/p Nitrous   - Pit per protocol   - Cervix unchanged, /-1   - Continue to monitor       Attending updated and in agreement with plan    Reese Jones MD  Ob/Gyn Resident  2024, 9:56 AM    
OB/GYN RESIDENT INTERVAL NOTE      Updated by RN, patient noted to have several late decelerations, patient noted to be laying flat on her back, while receiving ryland-care. Patient repositioned to right side with resolution of decelerations. Fetal tracing now category 1.     Will continue to monitor       Vitals:    05/21/24 0320 05/21/24 0330 05/21/24 0400 05/21/24 0430   BP: (!) 154/81 (!) 148/81 (!) 147/82 135/74   Pulse: 67 63 76 84   Resp:    16   Temp:  98.4 °F (36.9 °C)     TempSrc:  Oral     SpO2:  95% 93% 95%   Weight:       Height:             No results found for this or any previous visit (from the past 12 hour(s)).          Mara Michelle DO  OB/GYN Resident  [facility]  5/21/2024 5:03 AM       
Obstetric/Gynecology Note    Patient seen and examined. She has requested  section due to failed induction of labor. She has remained without cervical change for 24 hours as well as ruptured and on pitocin. Patient has not achieved active labor. Will proceed with CS. Fetal tracing category 1. Patient is cHTN and with max dosing of labetalol and procardia. She denies s/s PreE. Low threshold for mag sulfate. Will repeat preE labs in the AM unless status change prior to that time.     The patient was counseled on risks, benefits, alternatives to surgery including pain, bleeding, infection, scarring, damage to surrounding structures (bowel, bladder, ureters, uterus, tubes, ovaries, nerves, arteries, veins), need for further procedures, need for blood or blood products, post operative complications (pneumonia, blood clots), anesthesia complications, injury or death. Verbal and written consent obtained.     DO Diana Guy OBGYROD  [id number]  [facility], [address]  2024, 8:18 PM      
Obstetric/Gynecology Resident Interval Note    FHT showing deceleration. Resident at bedside with MIKE Hernandez to reposition patient. She repositioned herself as we entered the room. She is moving around in an attempt to control the pain and using nitrous. Baseline 120, mod variability, accelerations present, FHT noted to have prolonged decel at 0547 with niranjan to 100s, resolved with repositioning.    Vitals:    05/20/24 0040 05/20/24 0221 05/20/24 0452 05/20/24 0506   BP: 128/66 (!) 150/98 (!) 151/87    Pulse: 72 74 66    Resp:  16 16    Temp:  98.4 °F (36.9 °C)     TempSrc:  Oral     SpO2:   99% 96%   Weight:       Height:         In regard to cHTN (meds) patient continues to have elevated Bps on medication regimen. Plan to increase labetalol to 600 TID this AM. Denies s/sx of PreE.    Bob Lopez MD  OB/GYN Resident, PGY1  [place], [place]  5/20/2024, 5:55 AM      
Obstetric/Gynecology Resident Interval Note    Upon reevaluation of the patient, it was noted that her cervix remained unchanged at /-1 station. Induction was initiated at 2200 on 24 and the patient has not proceeded into active phase of labor. Discussion was had with the patient regarding continued induction of labor or proceeding to the OR for  section secondary to failed induction of labor.  After extensive discussion between the patient and her mother, the patient decided she would like to proceed with primary  section.    Decision for  section was made secondary to failed induction of labor.    Risks, benefits and alternatives were discussed extensively with the patient and her family. Patient aware of risks of bleeding, infection, scarring, injury to infant, injury to surrounding pelvic tissues/organs, need for blood/blood products, need for additional surgery including hysterectomy, as well are rare risk for cardiac arrest, thromboembolic event, pneumonia, and maternal or fetal death. All questions were answered. Reassurance given. Consent for  section signed and placed in chart, orders placed and anesthesiology notified.     Fetal Heart Monitor:  Baseline Heart Rate 145, moderate variability, present accelerations, absent decelerations  [place]: contractions, regular q 4 min    Vitals:    24 0902 24 0932 24 1204 24 1232   BP: (!) 156/81 (!) 150/81 (!) 147/75 (!) 144/86   Pulse: 77 86 100 87   Resp: 16 16 16 16   Temp:  98.8 °F (37.1 °C)     TempSrc:  Oral     SpO2:  97%     Weight:       Height:             Verónica Lindsay MD  OB/GYN Resident, PGY1  [place], [place]  2024, 12:37 PM      
and no murmur noted    Abdomen: abdomen soft, non-distended, non-tender, bowel sounds present   Fundus: non-tender, firm, below umbilicus  Incision: Prevena in place and functioning  Extremities:  no calf tenderness, non edematous    Labs:  Lab Results   Component Value Date    WBC 15.5 (H) 2024    HGB 8.2 (L) 2024    HCT 24.7 (L) 2024    MCV 92.5 2024     2024       Assessment/Plan:  Carmen Amor is a  POD # 2 s/p PLTCS   - Doing well, VSS    - female infant in General Care Nursery   - Encourage ambulation and use of incentive spirometer   - D/C madden catheter and saline lock IV on POD #1    - CBC completed, Hgb 8.2 currently asymptomatic    - Marylin/Motrin/Tylenol for pain   Rh negative/Rubella immune   - MMR not indicated    - Rhogam indicated and ordered   Breast feeding   - Denies s/s of mastitis  cHTN (meds)    - Denies s/s of PreE   - PreE labs wnl, P/C 0.17   - Labetalol 800 TID    - Procardia 120 XL daily    - BP's normotensive overnight     - Will continue to monitor closely   Bipolar disorder/Borderline personality disorder   - Reports current mood stability    - Currently controlled without medications   Depression/Anxiety    - Denies SI/HI   - Currently well controlled without medications    - Reports current mood stability    - SW met with patient with no concerns    - Will monitor for PP depression   BMI 39   - Flagyl x 48 hrs, patient allergic to cephalosporins    - s/p clinda and gentamicin prior to surgery    - Lovenox 40mg qd   Continue post-op care.    Counseling Completed:  Secondary Smoke risks and Sudden Infant Death Syndrome were reviewed with recommendations. Infant sleeping, \"back to sleep\" and avoidance of co-sleeping recommendations were reviewed.  Signs and Symptoms of Post Partum Depression were reviewed. The patient is to call if any occur.  Signs and symptoms of Mastitis were reviewed. The patient is to call if any occur for follow 
no driving with pain medicine and office follow-up were reviewed with patient     Attending Physician: Dr. Amauri Michelle DO  Ob/Gyn Resident  5/22/2024, 6:16 AM          Attending Physician Statement      I have personally seen, evaluated and discussed the care of Carmen Amor, including pertinent history and exam findings with the resident. I have reviewed and edited their note in the electronic medical record. The key elements of all parts of the encounter have been performed/reviewed by me.  I agree with the assessment, plan and orders as documented by the resident. (GC Modifier)    Vitals:    05/21/24 2250 05/22/24 0045 05/22/24 0544 05/22/24 0930   BP:  (!) 137/94 131/77 119/68   Pulse:  100 89 86   Resp:  18 18 16   Temp:  98.6 °F (37 °C) 98.9 °F (37.2 °C) 98 °F (36.7 °C)   TempSrc:  Oral Oral Oral   SpO2: 97% 96% 97% 98%   Weight:       Height:          Patient overall meeting postop milestones. Has known diagnosis of chronic hypertension on max dose of Procardia and Labetalol. She denies s/s preE at this time. BP overall stable today. PreE labs reviewed this AM, will repeat tomorrow. Continue inpatient care.    Angel Baugh DO    
- 5.11 m/uL Final    Hemoglobin 05/14/2024 11.6 (L)  11.9 - 15.1 g/dL Final    Hematocrit 05/14/2024 34.8 (L)  36.3 - 47.1 % Final    MCV 05/14/2024 91.6  82.6 - 102.9 fL Final    MCH 05/14/2024 30.5  25.2 - 33.5 pg Final    MCHC 05/14/2024 33.3  28.4 - 34.8 g/dL Final    RDW 05/14/2024 13.7  11.8 - 14.4 % Final    Platelets 05/14/2024 187  138 - 453 k/uL Final    MPV 05/14/2024 9.6  8.1 - 13.5 fL Final    NRBC Automated 05/14/2024 0.0  0.0 per 100 WBC Final    Sodium 05/14/2024 138  136 - 145 mmol/L Final    Potassium 05/14/2024 3.4 (L)  3.7 - 5.3 mmol/L Final    Chloride 05/14/2024 105  98 - 107 mmol/L Final    CO2 05/14/2024 20  20 - 31 mmol/L Final    Anion Gap 05/14/2024 13  9 - 16 mmol/L Final    Glucose 05/14/2024 102 (H)  74 - 99 mg/dL Final    BUN 05/14/2024 7  6 - 20 mg/dL Final    Creatinine 05/14/2024 0.5  0.50 - 0.90 mg/dL Final    Est, Glom Filt Rate 05/14/2024 >90  >60 mL/min/1.73m2 Final    Comment:       These results are not intended for use in patients <18 years of age.        eGFR results are calculated without a race factor using the 2021 CKD-EPI equation.  Careful clinical correlation is recommended, particularly when comparing to results   calculated using previous equations.  The CKD-EPI equation is less accurate in patients with extremes of muscle mass, extra-renal   metabolism of creatine, excessive creatine ingestion, or following therapy that affects   renal tubular secretion.      Calcium 05/14/2024 8.9  8.6 - 10.4 mg/dL Final    Total Protein 05/14/2024 6.4 (L)  6.6 - 8.7 g/dL Final    Albumin 05/14/2024 3.3 (L)  3.5 - 5.2 g/dL Final    Albumin/Globulin Ratio 05/14/2024 1.0  1.0 - 2.5 Final    Total Bilirubin 05/14/2024 0.2  0.00 - 1.20 mg/dL Final    Alkaline Phosphatase 05/14/2024 189 (H)  35 - 104 U/L Final    ALT 05/14/2024 22  10 - 35 U/L Final    AST 05/14/2024 21  10 - 35 U/L Final    Total Protein, Urine 05/14/2024 25  mg/dL Final    No normal range established.    Creatinine, 
mmol/L Final    CO2 05/15/2024 19 (L)  20 - 31 mmol/L Final    Anion Gap 05/15/2024 13  9 - 16 mmol/L Final    Glucose 05/15/2024 94  74 - 99 mg/dL Final    BUN 05/15/2024 7  6 - 20 mg/dL Final    Creatinine 05/15/2024 0.5  0.50 - 0.90 mg/dL Final    Est, Glom Filt Rate 05/15/2024 >90  >60 mL/min/1.73m2 Final    Comment:       These results are not intended for use in patients <18 years of age.        eGFR results are calculated without a race factor using the  CKD-EPI equation.  Careful clinical correlation is recommended, particularly when comparing to results   calculated using previous equations.  The CKD-EPI equation is less accurate in patients with extremes of muscle mass, extra-renal   metabolism of creatine, excessive creatine ingestion, or following therapy that affects   renal tubular secretion.      Calcium 05/15/2024 9.0  8.6 - 10.4 mg/dL Final    Total Protein 05/15/2024 6.2 (L)  6.6 - 8.7 g/dL Final    Albumin 05/15/2024 3.5  3.5 - 5.2 g/dL Final    Albumin/Globulin Ratio 05/15/2024 1.0  1.0 - 2.5 Final    Total Bilirubin 05/15/2024 0.3  0.00 - 1.20 mg/dL Final    Alkaline Phosphatase 05/15/2024 165 (H)  35 - 104 U/L Final    ALT 05/15/2024 22  10 - 35 U/L Final    AST 05/15/2024 23  10 - 35 U/L Final   Hospital Outpatient Visit on 2024   Component Date Value Ref Range Status    Specimen Description 2024 .VAGINA   Final    Special Requests 2024    Final                    Value:DO SENSI IF POS  KEFLEX ALLERGY      Culture 2024 NEGATIVE FOR GROUP B STREPTOCOCCI   Final   ]    BP (!) 143/71   Pulse 62   Temp 98.3 °F (36.8 °C) (Oral)   Resp 16   Ht 1.6 m (5' 3\")   Wt 102.1 kg (225 lb)   SpO2 97%   BMI 39.86 kg/m²       Pelvic Exam:  Cervix Check:     DILATION:  4 cm   EFFACEMENT:   70%   STATION:  -1 cm   CONSISTENCY:  soft   POSITION:  mid    FETAL POSITION: Cephalic    Assessment:  1. Carmen Amor is a 24 y.o. female  37w1d     2.   OB History    Para 
that you cannot control with the medicines you have been given   Swelling and/or pain in one or both legs   Cough, shortness of breath, or chest pain   Joint pain, fatigue, stiffness, rash, or other new symptoms   Become dizzy or faint   If you think you have an emergency,  CALL 911  .        Mike Hodge, DO

## 2024-05-24 RX ORDER — NIFEDIPINE 30 MG/1
120 TABLET, EXTENDED RELEASE ORAL DAILY
Qty: 120 TABLET | Refills: 2 | Status: SHIPPED | OUTPATIENT
Start: 2024-05-24 | End: 2024-08-22

## 2024-05-24 RX ORDER — LABETALOL 200 MG/1
800 TABLET, FILM COATED ORAL 3 TIMES DAILY
Qty: 360 TABLET | Refills: 2 | Status: SHIPPED | OUTPATIENT
Start: 2024-05-24 | End: 2024-08-22

## 2024-05-26 LAB — SURGICAL PATHOLOGY REPORT: NORMAL

## 2024-05-29 ENCOUNTER — OFFICE VISIT (OUTPATIENT)
Dept: OBGYN CLINIC | Age: 24
End: 2024-05-29

## 2024-05-29 VITALS
DIASTOLIC BLOOD PRESSURE: 76 MMHG | WEIGHT: 204 LBS | HEIGHT: 66 IN | SYSTOLIC BLOOD PRESSURE: 122 MMHG | BODY MASS INDEX: 32.78 KG/M2

## 2024-05-29 PROBLEM — O26.899 RH NEGATIVE STATE IN ANTEPARTUM PERIOD: Status: RESOLVED | Noted: 2023-10-31 | Resolved: 2024-05-29

## 2024-05-29 PROBLEM — Z67.91 RH NEGATIVE STATE IN ANTEPARTUM PERIOD: Status: RESOLVED | Noted: 2023-10-31 | Resolved: 2024-05-29

## 2024-05-29 PROCEDURE — NBSRV NON-BILLABLE SERVICE: Performed by: NURSE PRACTITIONER

## 2024-05-29 NOTE — PROGRESS NOTES
Pt presented to office for NANCY removal. Pt s/p LTCS 5/21/24.  NANCY removed without difficulty or discomfort to patient. Pt incision clean and clear of redness/drainage.  Pt with no concerns.  Pt scheduled for PP visit next week.  Pt instructed on incision care and restrictions.